# Patient Record
Sex: FEMALE | Race: BLACK OR AFRICAN AMERICAN | NOT HISPANIC OR LATINO | URBAN - METROPOLITAN AREA
[De-identification: names, ages, dates, MRNs, and addresses within clinical notes are randomized per-mention and may not be internally consistent; named-entity substitution may affect disease eponyms.]

---

## 2023-05-22 ENCOUNTER — WEB ENCOUNTER (OUTPATIENT)
Dept: URBAN - METROPOLITAN AREA CLINIC 70 | Facility: CLINIC | Age: 43
End: 2023-05-22

## 2023-05-25 ENCOUNTER — OFFICE VISIT (OUTPATIENT)
Dept: URBAN - METROPOLITAN AREA CLINIC 70 | Facility: CLINIC | Age: 43
End: 2023-05-25

## 2023-05-25 RX ORDER — TELMISARTAN AND HYDROCHLOROTHIAZIDE 40; 12.5 MG/1; MG/1
1 TABLET TABLET ORAL ONCE A DAY
Status: ACTIVE | COMMUNITY

## 2023-05-25 RX ORDER — HYDROXYCHLOROQUINE SULFATE 200 MG/1
AS DIRECTED TABLET ORAL
Status: ACTIVE | COMMUNITY

## 2023-05-25 RX ORDER — NAPROXEN 500 MG/1
TAKE 1 TABLET BY ORAL ROUTE BID PRN TABLET ORAL 2
Qty: 0 | Refills: 0 | Status: DISCONTINUED | COMMUNITY
Start: 1900-01-01

## 2023-05-25 RX ORDER — CITALOPRAM 20 MG/1
TAKE 1 TABLET (20 MG) BY ORAL ROUTE ONCE DAILY TABLET, FILM COATED ORAL 1
Qty: 0 | Refills: 0 | Status: DISCONTINUED | COMMUNITY
Start: 1900-01-01

## 2023-05-25 RX ORDER — LEVOTHYROXINE SODIUM 125 UG/1
1 TABLET IN THE MORNING ON AN EMPTY STOMACH TABLET ORAL ONCE A DAY
Status: ACTIVE | COMMUNITY

## 2023-05-25 RX ORDER — CALCIUM CARBONATE 300MG(750)
AS DIRECTED TABLET,CHEWABLE ORAL
Status: ACTIVE | COMMUNITY

## 2023-05-25 RX ORDER — TELMISARTAN AND HYDROCHLOROTHIAZIDE 80; 25 MG/1; MG/1
TAKE 1 TABLET BY ORAL ROUTE ONCE DAILY TABLET ORAL 1
Qty: 0 | Refills: 0 | Status: DISCONTINUED | COMMUNITY
Start: 1900-01-01

## 2023-05-25 RX ORDER — FLUTICASONE PROPIONATE AND SALMETEROL 50; 100 UG/1; UG/1
INHALE 1 PUFF BY INHALATION ROUTE 2 TIMES PER DAY IN THE MORNING AND EVENING APPROXIMATELY 12 HOURS APART POWDER RESPIRATORY (INHALATION) 2
Qty: 1 | Refills: 0 | Status: DISCONTINUED | COMMUNITY
Start: 1900-01-01

## 2023-05-25 RX ORDER — TENOFOVIR ALAFENAMIDE 25 MG/1
TAKE 1 TABLET (25 MG) BY ORAL ROUTE ONCE DAILY FOR 30 DAYS TABLET ORAL 1
Qty: 30 | Refills: 4 | Status: DISCONTINUED | COMMUNITY
Start: 2017-06-12

## 2023-05-25 RX ORDER — FLUOXETINE HYDROCHLORIDE 20 MG/1
1 CAPSULE CAPSULE ORAL ONCE A DAY
Status: ACTIVE | COMMUNITY

## 2023-06-17 PROBLEM — 239915006: Status: ACTIVE | Noted: 2023-06-17

## 2023-06-17 PROBLEM — 193093009: Status: ACTIVE | Noted: 2023-06-17

## 2023-06-17 PROBLEM — 40930008: Status: ACTIVE | Noted: 2023-06-17

## 2023-06-17 PROBLEM — 840539006: Status: ACTIVE | Noted: 2023-06-17

## 2023-06-17 PROBLEM — 34713006: Status: ACTIVE | Noted: 2023-06-17

## 2023-06-27 ENCOUNTER — OFFICE VISIT (OUTPATIENT)
Dept: URBAN - METROPOLITAN AREA TELEHEALTH 2 | Facility: TELEHEALTH | Age: 43
End: 2023-06-27
Payer: OTHER GOVERNMENT

## 2023-06-27 ENCOUNTER — LAB OUTSIDE AN ENCOUNTER (OUTPATIENT)
Dept: URBAN - METROPOLITAN AREA TELEHEALTH 2 | Facility: TELEHEALTH | Age: 43
End: 2023-06-27

## 2023-06-27 VITALS — BODY MASS INDEX: 22.5 KG/M2 | HEIGHT: 66 IN | WEIGHT: 140 LBS

## 2023-06-27 DIAGNOSIS — E03.9 HYPOTHYROIDISM, UNSPECIFIED TYPE: ICD-10-CM

## 2023-06-27 DIAGNOSIS — B18.1 CHRONIC ACTIVE TYPE B VIRAL HEPATITIS: ICD-10-CM

## 2023-06-27 DIAGNOSIS — K58.9 IRRITABLE BOWEL SYNDROME: ICD-10-CM

## 2023-06-27 DIAGNOSIS — M35.00 SECONDARY SJOGREN'S SYNDROME: ICD-10-CM

## 2023-06-27 DIAGNOSIS — U07.1 COVID: ICD-10-CM

## 2023-06-27 DIAGNOSIS — J45.909 ASTHMA: ICD-10-CM

## 2023-06-27 DIAGNOSIS — E55.9 VITAMIN D DEFICIENCY: ICD-10-CM

## 2023-06-27 DIAGNOSIS — Z71.89 VACCINE COUNSELING: ICD-10-CM

## 2023-06-27 DIAGNOSIS — G51.0 BELL'S PALSY: ICD-10-CM

## 2023-06-27 DIAGNOSIS — Z79.899 HIGH RISK MEDICATION USE: ICD-10-CM

## 2023-06-27 DIAGNOSIS — R76.9 ABNORMAL IMMUNOLOGICAL FINDING IN SERUM: ICD-10-CM

## 2023-06-27 PROCEDURE — 99205 OFFICE O/P NEW HI 60 MIN: CPT

## 2023-06-27 RX ORDER — HYDROXYCHLOROQUINE SULFATE 200 MG/1
AS DIRECTED TABLET ORAL
Status: ACTIVE | COMMUNITY

## 2023-06-27 RX ORDER — CYCLOSPORINE 0.5 MG/ML
1 DROP INTO AFFECTED EYE EMULSION OPHTHALMIC TWICE A DAY
Status: ACTIVE | COMMUNITY

## 2023-06-27 RX ORDER — CHOLECALCIFEROL (VITAMIN D3) 50 MCG
1 TABLET TABLET ORAL ONCE A DAY
Status: ACTIVE | COMMUNITY

## 2023-06-27 RX ORDER — LEVOTHYROXINE SODIUM 125 UG/1
1 TABLET IN THE MORNING ON AN EMPTY STOMACH TABLET ORAL ONCE A DAY
Status: ACTIVE | COMMUNITY

## 2023-06-27 RX ORDER — FLUOXETINE HYDROCHLORIDE 20 MG/1
1 CAPSULE CAPSULE ORAL ONCE A DAY
Status: ACTIVE | COMMUNITY

## 2023-06-27 RX ORDER — CALCIUM CARBONATE 300MG(750)
AS DIRECTED TABLET,CHEWABLE ORAL
Status: ACTIVE | COMMUNITY

## 2023-06-27 RX ORDER — TELMISARTAN AND HYDROCHLOROTHIAZIDE 40; 12.5 MG/1; MG/1
1 TABLET TABLET ORAL ONCE A DAY
Status: ACTIVE | COMMUNITY

## 2023-06-27 NOTE — HPI-TODAY'S VISIT:
Patient is a 42-year-old female last seen 2017 and being referred from the [facility] rheumatology Rossy Alicia to see us for hep b liver issues.  Copy of the note will be sent to referring providers.  She says she was diagnosed AIH and was diagnosed not by bx but by labs,  She says she is seen once a year for her hep b and says was not very different.  AIH has markers that suggest it but many diseases can generate the markers and even medicines as well. Many times a bx is needed to confirm.  Per the notes: Patient apparently has a history of Sjogren's syndrome with inflammatory arthritis and also reported history of autoimmune hepatitis.  I did not see hep b mentioned in the info that we saw.  Patient listed as being on a variety of medicines including artificial tears as needed, magnesium 400 mg twice daily, fish oil 1000 mg a day, vitamin D 10,000 units a day for 1 month and then 2000 units a day, she has a copper IUD, Prozac 20 mg a day, Synthroid 125 mcg a day, hydrochlorothiazide telmisartan 12.5/40 mg a day, and Plaquenil 200 mg a day.  She is on plaquenil for her rheum issues and also dxd no RA and re that she has not reseen rheum for that and seen by whitney monzon.  Rheumatoid factor can be false positive.  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  Surgical history includes thyroid lobectomy in the past, cholecystectomy in the past, left fifth toe x2 hammertoe surgery, right knee meniscal repair, breast augmentation in 2002.  Family history is that she is adopted but biologic mother had a history of hep b and more recently learned of RA/ autoimmune disorders.  Social history lives with daughter/niece 16/13 with autism and has son who is 6-year-old child with mild cerebral palsy. Not a smoker.  Infrequent alcohol.  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  Allergies none.  Last major diagnostic procedures/hospitalization was in arthritis third 2015.  Note mentions that the patient's Sjogren's arthritis is also accompanied by possible autoimmune hepatitis and has a history of hep B felt to be related from vertical transmission from both mother.    They mentioned she has not been on CellCept.  In the notes she knew that she saw us many years ago.  Recent labs showed sed rate 19, aldolase 6.2, CPK 68, gamma GTP 40, WBC 6.4 hemoglobin 12.6 platelet count 272 MCV 94.9 neutrophils 3392 and lymphocytes 35.9.  Sodium 139 potassium 4.0 glucose 88 BUN 13 creatinine 0.8 calcium 9.5 bilirubin 0.6 alk phos 61 AST 56  albumin 4.5  She says for 3m just seeing Dr Alicia and so is establishing with her and says maybe for the last 3-4 m labs have been up and so that is being.  She denied turmeric or green tea or gsm.  She says 8 yrs ago she had a flare and that led to referral then and when seen not felt due to aih and maybe due to hep and labs settled down.   Weight listed as being had a BMI of 24.54.   September 2017 labs showed glucose 88 BUN of 7 creatinine 0.73 sodium 141 potassium 3.8 calcium 9.5 albumin 4.2 bilirubin 0.6 alk phos 59 AST 19 ALT 21 WBC 5.7 hemoglobin 13.1 plate count 233 MCV 95.5. Last visit note that I could find was from September 27, 2017 Harris states that she had the history of the hepatitis B.  Was on.  At that time.  Hep B level was 550 then AST 19 ALT 21 bilirubin 0.6 alk phos 59  Notes mention that she had asthma and was using however, and that her blood pressure has been variable at that time.    Had previously seen Dr. CHEN for irritable bowel.  Hep A immunity had been seen.  Reported initial biopsy stage III in the past.   Colonoscopy with polyp in 2015. Plan 1.  We need to update labs but with the emphasis on the hep b and its role on this. May need a bx pending. 2.  We need toupdate imaging. 3.  Need to make sure that the hep B is under control and not flaring as a cause of lfts, Need to look IGGs to see if the aih feature may be on and do a bx if not sure. 4. If go isp for aih or rheum issue may need to be on meds for the hpe b to control. 5. RTC in 3-4 weeks and revisit and do telemed.  Duration of the visit was 63 minutes with 40 minutes of chart prep and 23 minutes healow telemed today for the visit reviewing recent records current status and future plans for the patient.

## 2023-07-18 ENCOUNTER — TELEPHONE ENCOUNTER (OUTPATIENT)
Dept: URBAN - METROPOLITAN AREA CLINIC 86 | Facility: CLINIC | Age: 43
End: 2023-07-18

## 2023-07-18 ENCOUNTER — WEB ENCOUNTER (OUTPATIENT)
Dept: URBAN - METROPOLITAN AREA CLINIC 86 | Facility: CLINIC | Age: 43
End: 2023-07-18

## 2023-07-18 NOTE — HPI-TODAY'S VISIT:
Dear Apryl Haley, July 14 labs show sodium 138 potassium 3.6 chloride 106 CO2 26 BUN of 11 creatinine 0.79 total protein 7.0 bilirubin 0.7 albumin 3.7 alk phos 49 and these are normal.  Your AST however was 94 ALT was 202 calcium 8.8. WBC 4.71 hemoglobin 11.5 platelet count 245 with an MCV slightly up at 99.  Neutrophils 2.10 and lymphocytes 2.0. Did not see the hep B level listed as pending or come back on either of the 2 faxes sent. We need to know what this shows to know if the hep B could be driving the labs to be up. The hep B DNA level was also one of the tests that we ordered as was the hep B surface antigen as well as immunoglobulins but those tests have not come back. If you have access to the portal for the labs where you did these, please if these are pending and if so confirm for us. Last labs that we saw on you showed an AST of 56 and an ALT of 122 so these labs are higher but really need that hep B level to know what is the next step. Dr Malone

## 2023-07-19 ENCOUNTER — TELEPHONE ENCOUNTER (OUTPATIENT)
Dept: URBAN - METROPOLITAN AREA CLINIC 86 | Facility: CLINIC | Age: 43
End: 2023-07-19

## 2023-07-19 NOTE — HPI-TODAY'S VISIT:
Dear Apryl Haley,  July 14th labs showed IgG normal at 1417 with IgM normal at 119 and IgA normal at 343.  Given that these values are normal, this makes it less likely that autoimmune disorders triggered the liver labs.  We are now just waiting for the hepatitis B DNA to come back and if it significantly elevated then we just need to proceed to treatment as the next step but if it is not significantly elevated we need to talk about possibly doing a liver biopsy. Dr Malone

## 2023-07-19 NOTE — HPI-TODAY'S VISIT:
Dear Apryl Haley, July 14 hepatitis B surface antigen positive and waiting for the HBV dna. Do you see it pending in your lab pt portal there? Dr Malone

## 2023-07-20 ENCOUNTER — WEB ENCOUNTER (OUTPATIENT)
Dept: URBAN - METROPOLITAN AREA CLINIC 86 | Facility: CLINIC | Age: 43
End: 2023-07-20

## 2023-07-21 ENCOUNTER — TELEPHONE ENCOUNTER (OUTPATIENT)
Dept: URBAN - METROPOLITAN AREA CLINIC 86 | Facility: CLINIC | Age: 43
End: 2023-07-21

## 2023-07-21 ENCOUNTER — WEB ENCOUNTER (OUTPATIENT)
Dept: URBAN - METROPOLITAN AREA CLINIC 86 | Facility: CLINIC | Age: 43
End: 2023-07-21

## 2023-07-21 RX ORDER — LEVOTHYROXINE SODIUM 125 UG/1
1 TABLET IN THE MORNING ON AN EMPTY STOMACH TABLET ORAL ONCE A DAY
COMMUNITY

## 2023-07-21 RX ORDER — TELMISARTAN AND HYDROCHLOROTHIAZIDE 40; 12.5 MG/1; MG/1
1 TABLET TABLET ORAL ONCE A DAY
COMMUNITY

## 2023-07-21 RX ORDER — FLUOXETINE HYDROCHLORIDE 20 MG/1
1 CAPSULE CAPSULE ORAL ONCE A DAY
COMMUNITY

## 2023-07-21 RX ORDER — CALCIUM CARBONATE 300MG(750)
AS DIRECTED TABLET,CHEWABLE ORAL
COMMUNITY

## 2023-07-21 RX ORDER — HYDROXYCHLOROQUINE SULFATE 200 MG/1
AS DIRECTED TABLET ORAL
COMMUNITY

## 2023-07-21 RX ORDER — CYCLOSPORINE 0.5 MG/ML
1 DROP INTO AFFECTED EYE EMULSION OPHTHALMIC TWICE A DAY
COMMUNITY

## 2023-07-21 RX ORDER — TENOFOVIR DISPROXIL FUMARATE 300 MG/1
1 TABLET TABLET ORAL ONCE A DAY
Qty: 30 | Refills: 1 | OUTPATIENT
Start: 2023-07-24

## 2023-07-21 RX ORDER — CHOLECALCIFEROL (VITAMIN D3) 50 MCG
1 TABLET TABLET ORAL ONCE A DAY
COMMUNITY

## 2023-07-21 NOTE — HPI-TODAY'S VISIT:
Dear Apryl Haley,  July 18th: finally hbv labs showing hep B level at 6.8 million. July 14 labs showed sodium 138 potassium 3.6 chloride 106 CO2 26 BUN of 11 creatinine 0.79 total bilirubin 0.7 alk phos 49 AST of 94 .  Calcium 8.8.  Hep B surface antigen was confirmed positive.   Quantitative immunoglobulins showed IgG normal at 1417, IgM normal 119 and IgA normal at 343.  This suggests less likely for an immune driven event. WBC 4.71 hemoglobin 11.5 platelet count 245 with an MCV elevated slightly at 99. The treatment criteria for hepatitis B is to have a DNA level over 2000, and an abnormal ALT over 2 review had both. As you recall also we had been concerned if this could be somehow related to your immune issues or not and that you also may be soon or  in future be needing stronger immunosuppression and given the HBV DNA level, the abnormal ALT, and the possible need for stronger immunosuppression for your rheumatologic issues,  I believe that you should move forward and start on a medication for the hepatitis B and that is in line with what we discussed such as tenofovir DF. This will require close monitoring to look at your response. I could not reach you todayto review this,  so I am sending this to you through the portal so that you can review it and if you decide that you would like to start on the medication for this now as we discussed also at the visit, then I can call it in, and we can start it over the weekend or we can also discuss this at your upcoming visit on the 24th in more detail with you. You will need close lab monitoring again with this and we would order for labs in 2 and 4 weeks and then in  8 weeks after starting this medication so we can watch the on tx HBV response and to make sure that the liver labs drop do not have an immune flare as we start to suppress the hepatitis B. This medicine also requires close renal monitoring. Dr. Malone

## 2023-07-24 ENCOUNTER — OFFICE VISIT (OUTPATIENT)
Dept: URBAN - METROPOLITAN AREA CLINIC 117 | Facility: CLINIC | Age: 43
End: 2023-07-24
Payer: OTHER GOVERNMENT

## 2023-07-24 ENCOUNTER — WEB ENCOUNTER (OUTPATIENT)
Dept: URBAN - METROPOLITAN AREA CLINIC 86 | Facility: CLINIC | Age: 43
End: 2023-07-24

## 2023-07-24 DIAGNOSIS — B19.10 HEPATITIS B: ICD-10-CM

## 2023-07-24 PROCEDURE — 76705 ECHO EXAM OF ABDOMEN: CPT

## 2023-07-24 PROCEDURE — 93975 VASCULAR STUDY: CPT

## 2023-07-24 RX ORDER — FLUOXETINE HYDROCHLORIDE 20 MG/1
1 CAPSULE CAPSULE ORAL ONCE A DAY
Status: ACTIVE | COMMUNITY

## 2023-07-24 RX ORDER — CALCIUM CARBONATE 300MG(750)
AS DIRECTED TABLET,CHEWABLE ORAL
Status: ACTIVE | COMMUNITY

## 2023-07-24 RX ORDER — HYDROXYCHLOROQUINE SULFATE 200 MG/1
AS DIRECTED TABLET ORAL
Status: ACTIVE | COMMUNITY

## 2023-07-24 RX ORDER — LEVOTHYROXINE SODIUM 125 UG/1
1 TABLET IN THE MORNING ON AN EMPTY STOMACH TABLET ORAL ONCE A DAY
Status: ACTIVE | COMMUNITY

## 2023-07-24 RX ORDER — CYCLOSPORINE 0.5 MG/ML
1 DROP INTO AFFECTED EYE EMULSION OPHTHALMIC TWICE A DAY
Status: ACTIVE | COMMUNITY

## 2023-07-24 RX ORDER — CHOLECALCIFEROL (VITAMIN D3) 50 MCG
1 TABLET TABLET ORAL ONCE A DAY
Status: ACTIVE | COMMUNITY

## 2023-07-24 RX ORDER — TELMISARTAN AND HYDROCHLOROTHIAZIDE 40; 12.5 MG/1; MG/1
1 TABLET TABLET ORAL ONCE A DAY
Status: ACTIVE | COMMUNITY

## 2023-07-25 ENCOUNTER — TELEPHONE ENCOUNTER (OUTPATIENT)
Dept: URBAN - METROPOLITAN AREA CLINIC 86 | Facility: CLINIC | Age: 43
End: 2023-07-25

## 2023-07-25 ENCOUNTER — WEB ENCOUNTER (OUTPATIENT)
Dept: URBAN - METROPOLITAN AREA CLINIC 86 | Facility: CLINIC | Age: 43
End: 2023-07-25

## 2023-07-25 NOTE — HPI-TODAY'S VISIT:
Dear Apryl Haley,  July 24th ultrasound unfortunately came back after you and I spoke. Pancreas was unremarkable. Liver was homogeneous/even in echotexture with no discrete liver lesions. Liver vessels patent which was good to see. Common bile duct was normal at 3 mm. Right kidney 10.5 cm no hydronephrosis. Spleen normal at 9.8 cm. Splenic vessels were patent as well. Overall good to see that everything remains stable and lets get you started on the hepatitis B medicine and reassess issues. Good to be able to catch up with you today and go over all the information directly with you.  I am sorry that we did not have this information back at that time. Dr. Malone

## 2023-07-26 ENCOUNTER — TELEPHONE ENCOUNTER (OUTPATIENT)
Dept: URBAN - METROPOLITAN AREA CLINIC 86 | Facility: CLINIC | Age: 43
End: 2023-07-26

## 2023-07-26 ENCOUNTER — WEB ENCOUNTER (OUTPATIENT)
Dept: URBAN - METROPOLITAN AREA CLINIC 86 | Facility: CLINIC | Age: 43
End: 2023-07-26

## 2023-07-31 ENCOUNTER — LAB OUTSIDE AN ENCOUNTER (OUTPATIENT)
Dept: URBAN - METROPOLITAN AREA CLINIC 86 | Facility: CLINIC | Age: 43
End: 2023-07-31

## 2023-08-04 ENCOUNTER — OFFICE VISIT (OUTPATIENT)
Dept: URBAN - METROPOLITAN AREA TELEHEALTH 2 | Facility: TELEHEALTH | Age: 43
End: 2023-08-04
Payer: OTHER GOVERNMENT

## 2023-08-04 VITALS — HEIGHT: 66 IN | WEIGHT: 145 LBS | BODY MASS INDEX: 23.3 KG/M2

## 2023-08-04 DIAGNOSIS — K58.8 OTHER IRRITABLE BOWEL SYNDROME: ICD-10-CM

## 2023-08-04 DIAGNOSIS — R74.8 ABNORMAL LIVER ENZYMES: ICD-10-CM

## 2023-08-04 DIAGNOSIS — B18.1 CHRONIC ACTIVE TYPE B VIRAL HEPATITIS: ICD-10-CM

## 2023-08-04 DIAGNOSIS — J45.909 ASTHMA: ICD-10-CM

## 2023-08-04 PROCEDURE — 99214 OFFICE O/P EST MOD 30 MIN: CPT

## 2023-08-04 RX ORDER — CHOLECALCIFEROL (VITAMIN D3) 50 MCG
1 TABLET TABLET ORAL ONCE A DAY
Status: ACTIVE | COMMUNITY

## 2023-08-04 RX ORDER — LEVOTHYROXINE SODIUM 125 UG/1
1 TABLET IN THE MORNING ON AN EMPTY STOMACH TABLET ORAL ONCE A DAY
Status: ACTIVE | COMMUNITY

## 2023-08-04 RX ORDER — TELMISARTAN AND HYDROCHLOROTHIAZIDE 40; 12.5 MG/1; MG/1
1 TABLET TABLET ORAL ONCE A DAY
Status: ACTIVE | COMMUNITY

## 2023-08-04 RX ORDER — FLUOXETINE HYDROCHLORIDE 20 MG/1
1 CAPSULE CAPSULE ORAL ONCE A DAY
Status: ACTIVE | COMMUNITY

## 2023-08-04 RX ORDER — TENOFOVIR DISPROXIL FUMARATE 300 MG/1
1 TABLET TABLET ORAL ONCE A DAY
Qty: 30 | Refills: 1 | Status: ACTIVE | COMMUNITY
Start: 2023-07-24

## 2023-08-04 RX ORDER — HYDROXYCHLOROQUINE SULFATE 200 MG/1
AS DIRECTED TABLET ORAL
Status: ACTIVE | COMMUNITY

## 2023-08-04 RX ORDER — CALCIUM CARBONATE 300MG(750)
AS DIRECTED TABLET,CHEWABLE ORAL
Status: ACTIVE | COMMUNITY

## 2023-08-04 RX ORDER — CYCLOSPORINE 0.5 MG/ML
1 DROP INTO AFFECTED EYE EMULSION OPHTHALMIC TWICE A DAY
Status: ACTIVE | COMMUNITY

## 2023-08-04 NOTE — HPI-TODAY'S VISIT:
Patient is a 42-year-old female last seen June 2023 and prior back in 2017 and being referred from the [facility] rheumatology Rossy Alicia to see us for hep b liver issues and on tdf now.  Copy of the note will be sent to referring providers.  She did get the TDF and delivered to her and she has been on it for 4 days. Not able to get samples pre the treatment.  She may want to take it with food. She also comments size if larger and with her sjogrens some difficulty swallowing it and if persists may need to appeal for the much smaller vemlidy.  She says she was previously diagnosed AIH and was diagnosed not by bx but by labs and hbv is what we saw.  AIH has markers that suggest it but many diseases can generate the markers and even medicines as well. Many times a bx is needed to confirm.  July 24th ultrasound unfortunately came back after you and I spoke. Pancreas was unremarkable. Liver was homogeneous/even in echotexture with no discrete liver lesions. Liver vessels patent which was good to see. Common bile duct was normal at 3 mm. Right kidney 10.5 cm no hydronephrosis. Spleen normal at 9.8 cm. Splenic vessels were patent as well. Overall good to see that everything remains stable and lets get you started on the hepatitis B medicine and reassess issues.   July 18th: finally hbv labs showing hep B level at 6.8 million. July 14 labs showed sodium 138 potassium 3.6 chloride 106 CO2 26 BUN of 11 creatinine 0.79 total bilirubin 0.7 alk phos 49 AST of 94 .  Calcium 8.8.  Hep B surface antigen was confirmed positive.   Quantitative immunoglobulins showed IgG normal at 1417, IgM normal 119 and IgA normal at 343.  This suggests less likely for an immune driven event. WBC 4.71 hemoglobin 11.5 platelet count 245 with an MCV elevated slightly at 99. The treatment criteria for hepatitis B is to have a DNA level over 2000, and an abnormal ALT over 2 review had both.  She needs to remain on TDF to control the hep b and can then avoid worsening other isp.  We do need to follow labs in 2 and 4 and 6 weeks and look for an immune flare as drop the viral load can see.  Per the notes: Patient apparently has a history of Sjogren's syndrome with inflammatory arthritis and also reported history of autoimmune hepatitis.  I did not see hep b mentioned in the info that we saw.  Patient listed as being on a variety of medicines including artificial tears as needed, magnesium 400 mg twice daily, fish oil 1000 mg a day, vitamin D 10,000 units a day for 1 month and then 2000 units a day, she has a copper IUD, Prozac 20 mg a day, Synthroid 125 mcg a day, hydrochlorothiazide telmisartan 12.5/40 mg a day, and Plaquenil 200 mg a day.  She is on plaquenil for her rheum issues and also dxd no RA and re that she has not reseen rheum for that and seen by whitney monzon.  Rheumatoid factor can be false positive.  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  Surgical history includes thyroid lobectomy in the past, cholecystectomy in the past, left fifth toe x2 hammertoe surgery, right knee meniscal repair, breast augmentation in 2002.  Family history is that she is adopted but biologic mother had a history of hep b and more recently learned of RA/ autoimmune disorders.  Social history lives with daughter/niece 16/13 with autism and has son who is 6-year-old child with mild cerebral palsy. Not a smoker.  Infrequent alcohol.  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  Allergies none.  Last major diagnostic procedures/hospitalization was in arthritis third 2015.  Note mentions that the patient's Sjogren's arthritis is also accompanied by possible autoimmune hepatitis and has a history of hep B felt to be related from vertical transmission from both mother.    They mentioned she has not been on CellCept.  In the notes she knew that she saw us many years ago.  Recent labs showed sed rate 19, aldolase 6.2, CPK 68, gamma GTP 40, WBC 6.4 hemoglobin 12.6 platelet count 272 MCV 94.9 neutrophils 3392 and lymphocytes 35.9.  Sodium 139 potassium 4.0 glucose 88 BUN 13 creatinine 0.8 calcium 9.5 bilirubin 0.6 alk phos 61 AST 56  albumin 4.5  She says for 3m just seeing Dr Alicia and so is establishing with her and says maybe for the last 3-4 m labs have been up and so that is being.  She denied turmeric or green tea or gsm.  She says 8 yrs ago she had a flare and that led to referral then and when seen not felt due to aih and maybe due to hep and labs settled down.   Weight listed as being had a BMI of 24.54.   September 2017 labs showed glucose 88 BUN of 7 creatinine 0.73 sodium 141 potassium 3.8 calcium 9.5 albumin 4.2 bilirubin 0.6 alk phos 59 AST 19 ALT 21 WBC 5.7 hemoglobin 13.1 plate count 233 MCV 95.5. Last visit note that I could find was from September 27, 2017 Harris states that she had the history of the hepatitis B.  Was on.  At that time.  Hep B level was 550 then AST 19 ALT 21 bilirubin 0.6 alk phos 59  Notes mention that she had asthma and was using however, and that her blood pressure has been variable at that time.    Had previously seen Dr. CHEN for irritable bowel.  Hep A immunity had been seen.  Reported initial biopsy stage III in the past.   Colonoscopy with polyp in 2015.  Plan 1. Take the tdf with food. 2. Do labs in 2 and 4 and 6 weeks. 3. DO telemed 7-8 weeks to see how doing. 4. Let us know if issues, 5. If has toxicity to tdf then can apply to tdf.  Duration of the visit was 31 minutes with 15 minutes of chart prep loaing the labs and u.s and contact notes with pt for the visit and then 16 minutes by healow telemed today for the visit reviewing recent records current status and future plans for the patient.

## 2023-08-07 ENCOUNTER — LAB OUTSIDE AN ENCOUNTER (OUTPATIENT)
Dept: URBAN - METROPOLITAN AREA CLINIC 86 | Facility: CLINIC | Age: 43
End: 2023-08-07

## 2023-08-16 ENCOUNTER — TELEPHONE ENCOUNTER (OUTPATIENT)
Dept: URBAN - METROPOLITAN AREA CLINIC 86 | Facility: CLINIC | Age: 43
End: 2023-08-16

## 2023-08-16 NOTE — HPI-TODAY'S VISIT:
Dear Apryl Haley,  The 8/15/2023 labs were sent to me. The sodium 136 potassium 3.3, creatinine 0.8, bilirubin 0.8, alkaline phosphatase 49, AST 75, .  The IgA 388, IgG 1574, IgM 114.  The hepatitis B surface antigen is positive.  They did not run a hepatitis B viral DNA.  Previously though back in July the alkaline phosphatase is 49 and the AST 94, .  So the liver enzymes are definitely lower and i suspect the hbv dna is still pending and we will follow up when we have that result. I called and discussed these with you as  was out of the office but sending you this as well for you records.  Donna Moss PA-C

## 2023-08-18 ENCOUNTER — TELEPHONE ENCOUNTER (OUTPATIENT)
Dept: URBAN - METROPOLITAN AREA CLINIC 86 | Facility: CLINIC | Age: 43
End: 2023-08-18

## 2023-08-18 ENCOUNTER — WEB ENCOUNTER (OUTPATIENT)
Dept: URBAN - METROPOLITAN AREA CLINIC 86 | Facility: CLINIC | Age: 43
End: 2023-08-18

## 2023-08-18 NOTE — HPI-TODAY'S VISIT:
Dear Apryl Haley, August 15 labs were sent in showing that your IgA was normal at 388, IgG normal at 1574, and IgM normal at 114. Other labs showed sodium 136 potassium 3.3 chloride 105 CO2 26 BUN of 7 creatinine 0.8 and glucose was elevated at 146 and possibly you were not fasting that day?  Albumin was normal at 4.1, bilirubin 0.8, alk phos 49 and AST 75 and  with calcium 9.5. WBC 4.74, hemoglobin 12.9, platelet count 262 with an MCV of 96 which is elevated.  Neutrophils 2.11 and lymphocytes 2.21 normal. As you recall, you had recently started the tenofovir DF and your July 14 at labs had shown an AST that was 94 and an ALT of 202 which were higher so these are coming down. We really need to see what the hepatitis B level comes back. Your previous creatinine was 0.79 and this is approximate the same at 0.8. Please redo labs again for us again in another 2 weeks and 4 weeks. We hope to see these labs continue to drop more and they should on this medicine. Dr. Malone

## 2023-08-21 ENCOUNTER — LAB OUTSIDE AN ENCOUNTER (OUTPATIENT)
Dept: URBAN - METROPOLITAN AREA TELEHEALTH 2 | Facility: TELEHEALTH | Age: 43
End: 2023-08-21

## 2023-08-21 ENCOUNTER — WEB ENCOUNTER (OUTPATIENT)
Dept: URBAN - METROPOLITAN AREA CLINIC 86 | Facility: CLINIC | Age: 43
End: 2023-08-21

## 2023-08-21 ENCOUNTER — TELEPHONE ENCOUNTER (OUTPATIENT)
Dept: URBAN - METROPOLITAN AREA CLINIC 86 | Facility: CLINIC | Age: 43
End: 2023-08-21

## 2023-08-21 NOTE — HPI-TODAY'S VISIT:
Dear Apryl Haley, More labs sent in from August 15, 2023 showing that the hep B surface antigen was positive which is not surprising.  We will see what the DNA is doing with the treatment. Dr Malone

## 2023-08-23 ENCOUNTER — TELEPHONE ENCOUNTER (OUTPATIENT)
Dept: URBAN - METROPOLITAN AREA CLINIC 86 | Facility: CLINIC | Age: 43
End: 2023-08-23

## 2023-08-23 NOTE — HPI-TODAY'S VISIT:
Dear Apryl Haley, Curiously they did send this to the [facility] lab that you did previously on August 15.  It was as you stated to 135,000 for the hepatitis B virus level. The July virus level was 6.8 million so clearly lower now. Need to follow this over time. Dr. Malone

## 2023-08-24 ENCOUNTER — TELEPHONE ENCOUNTER (OUTPATIENT)
Dept: URBAN - METROPOLITAN AREA CLINIC 86 | Facility: CLINIC | Age: 43
End: 2023-08-24

## 2023-08-24 NOTE — HPI-TODAY'S VISIT:
Dear Apryl Haley, August 15 labs showed sodium 136 potassium low at 3.3 and you should have like a banana twice a day, or V8 juice twice a day or tomato juice twice a day for 3 days to see if that will correct that. Chloride 105 CO2 of 26 BUN of 7 creatinine 0.8. Sugar was 146 elevated and may be not fasting that day?  Please share with primary provider. Albumin normal 4.1, bilirubin 0.8 alk phos 49 AST 75 and .  Prior July labs had shown an AST of 94 and ALT of 202 so this appears to be coming down.  Prior creatinine was 0.79 so that is the same.  Previous hep B level as we talked about had been higher at 6.8 million so overall labs moving in the right direction there. Your hep B surface antigen was noted to be positive which again not a surprise that you have been chronically positive.  The hep B level as you saw yesterday had come back at 135,000 The immunoglobulins which are looking for other signs of immune issues showed that your IgG is normal at 1574 and IgM normal at 114 IgA normal at 388. WBC was 4.74, hemoglobin 12.9 and platelet count was 262.  Your MCV was a little bit up at 96.  Your MCH was a little low at 35. Neutrophils were 2.11 and lymphocytes were 2.21. I would redo the labs 2 weeks after these and then again in 4 weeks and then at that point if they remain stable we can slow down the labs after that.  Usually the first month or two is  that timeframe during which we need to carefully watch and look for any flares or changes on the labs. Dr Malone

## 2023-08-28 ENCOUNTER — LAB OUTSIDE AN ENCOUNTER (OUTPATIENT)
Dept: URBAN - METROPOLITAN AREA CLINIC 86 | Facility: CLINIC | Age: 43
End: 2023-08-28

## 2023-09-11 ENCOUNTER — LAB OUTSIDE AN ENCOUNTER (OUTPATIENT)
Dept: URBAN - METROPOLITAN AREA CLINIC 86 | Facility: CLINIC | Age: 43
End: 2023-09-11

## 2023-09-20 ENCOUNTER — TELEPHONE ENCOUNTER (OUTPATIENT)
Dept: URBAN - METROPOLITAN AREA CLINIC 86 | Facility: CLINIC | Age: 43
End: 2023-09-20

## 2023-09-20 RX ORDER — TENOFOVIR DISPROXIL FUMARATE 300 MG/1
1 TABLET TABLET ORAL ONCE A DAY
Qty: 30 | Refills: 0
Start: 2023-07-24

## 2023-09-25 ENCOUNTER — LAB OUTSIDE AN ENCOUNTER (OUTPATIENT)
Dept: URBAN - METROPOLITAN AREA CLINIC 86 | Facility: CLINIC | Age: 43
End: 2023-09-25

## 2023-10-05 ENCOUNTER — OFFICE VISIT (OUTPATIENT)
Dept: URBAN - METROPOLITAN AREA TELEHEALTH 2 | Facility: TELEHEALTH | Age: 43
End: 2023-10-05

## 2023-10-05 ENCOUNTER — TELEPHONE ENCOUNTER (OUTPATIENT)
Dept: URBAN - METROPOLITAN AREA CLINIC 86 | Facility: CLINIC | Age: 43
End: 2023-10-05

## 2023-10-05 VITALS — BODY MASS INDEX: 23.3 KG/M2 | WEIGHT: 145 LBS | HEIGHT: 66 IN

## 2023-10-05 RX ORDER — CALCIUM CARBONATE 300MG(750)
AS DIRECTED TABLET,CHEWABLE ORAL
Status: ACTIVE | COMMUNITY

## 2023-10-05 RX ORDER — FLUOXETINE HYDROCHLORIDE 20 MG/1
1 CAPSULE CAPSULE ORAL ONCE A DAY
Status: ACTIVE | COMMUNITY

## 2023-10-05 RX ORDER — CYCLOSPORINE 0.5 MG/ML
1 DROP INTO AFFECTED EYE EMULSION OPHTHALMIC TWICE A DAY
Status: ACTIVE | COMMUNITY

## 2023-10-05 RX ORDER — HYDROXYCHLOROQUINE SULFATE 200 MG/1
AS DIRECTED TABLET ORAL
Status: ACTIVE | COMMUNITY

## 2023-10-05 RX ORDER — CHOLECALCIFEROL (VITAMIN D3) 50 MCG
1 TABLET TABLET ORAL ONCE A DAY
Status: ACTIVE | COMMUNITY

## 2023-10-05 RX ORDER — TELMISARTAN AND HYDROCHLOROTHIAZIDE 40; 12.5 MG/1; MG/1
1 TABLET TABLET ORAL ONCE A DAY
Status: ACTIVE | COMMUNITY

## 2023-10-05 RX ORDER — LEVOTHYROXINE SODIUM 125 UG/1
1 TABLET IN THE MORNING ON AN EMPTY STOMACH TABLET ORAL ONCE A DAY
Status: ACTIVE | COMMUNITY

## 2023-10-05 RX ORDER — TENOFOVIR DISPROXIL FUMARATE 300 MG/1
1 TABLET TABLET ORAL ONCE A DAY
Qty: 30 | Refills: 0 | Status: ACTIVE | COMMUNITY
Start: 2023-07-24

## 2023-10-05 NOTE — HPI-TODAY'S VISIT:
Patient is a 43-year-old female last seen Aug 2023 and prior back in 2017 and being referred from the [facility] rheumatology Rossy Alicia to see us for hep b liver issues and on tdf now.  Copy of the note will be sent to referring providers. Dear Apryl Haley,  The 8/15/2023 labs were sent to me. The sodium 136 potassium 3.3, creatinine 0.8, bilirubin 0.8, alkaline phosphatase 49, AST 75, .  The IgA 388, IgG 1574, IgM 114.  The hepatitis B surface antigen is positive.  They did not run a hepatitis B viral DNA.  Previously though back in July the alkaline phosphatase is 49 and the AST 94, .  So the liver enzymes are definitely lower and i suspect the hbv dna is still pending and we will follow up when we have that result. I called and discussed these with you as  was out of the office but sending you this as well for you records.  Donna Moss PA-C Dear Apryl Haley, August 15 labs showed sodium 136 potassium low at 3.3 and you should have like a banana twice a day, or V8 juice twice a day or tomato juice twice a day for 3 days to see if that will correct that. Chloride 105 CO2 of 26 BUN of 7 creatinine 0.8. Sugar was 146 elevated and may be not fasting that day?  Please share with primary provider. Albumin normal 4.1, bilirubin 0.8 alk phos 49 AST 75 and .  Prior July labs had shown an AST of 94 and ALT of 202 so this appears to be coming down.  Prior creatinine was 0.79 so that is the same.  Previous hep B level as we talked about had been higher at 6.8 million so overall labs moving in the right direction there. Your hep B surface antigen was noted to be positive which again not a surprise that you have been chronically positive.  The hep B level as you saw yesterday had come back at 135,000 The immunoglobulins which are looking for other signs of immune issues showed that your IgG is normal at 1574 and IgM normal at 114 IgA normal at 388. WBC was 4.74, hemoglobin 12.9 and platelet count was 262.  Your MCV was a little bit up at 96.  Your MCH was a little low at 35. Neutrophils were 2.11 and lymphocytes were 2.21. I would redo the labs 2 weeks after these and then again in 4 weeks and then at that point if they remain stable we can slow down the labs after that.  Usually the first month or two is  that timeframe during which we need to carefully watch and look for any flares or changes on the labs. Dr Faisal Haley, Curiously they did send this to the [facility] lab that you did previously on August 15.  It was as you stated to 135,000 for the hepatitis B virus level. The July virus level was 6.8 million so clearly lower now. Need to follow this over time. Dr. Faisal Haley, More labs sent in from August 15, 2023 showing that the hep B surface antigen was positive which is not surprising.  We will see what the DNA is doing with the treatment. Dr Faisal Haley, August 15 labs were sent in showing that your IgA was normal at 388, IgG normal at 1574, and IgM normal at 114. Other labs showed sodium 136 potassium 3.3 chloride 105 CO2 26 BUN of 7 creatinine 0.8 and glucose was elevated at 146 and possibly you were not fasting that day?  Albumin was normal at 4.1, bilirubin 0.8, alk phos 49 and AST 75 and  with calcium 9.5. WBC 4.74, hemoglobin 12.9, platelet count 262 with an MCV of 96 which is elevated.  Neutrophils 2.11 and lymphocytes 2.21 normal. As you recall, you had recently started the tenofovir DF and your July 14 at labs had shown an AST that was 94 and an ALT of 202 which were higher so these are coming down. We really need to see what the hepatitis B level comes back. Your previous creatinine was 0.79 and this is approximate the same at 0.8. Please redo labs again for us again in another 2 weeks and 4 weeks. We hope to see these labs continue to drop more and they should on this medicine. Dr. Malone  She did get the TDF and delivered to her and she has been on it for 4 days. Not able to get samples pre the treatment.  She may want to take it with food. She also comments size if larger and with her sjogrens some difficulty swallowing it and if persists may need to appeal for the much smaller vemlidy.  She says she was previously diagnosed AIH and was diagnosed not by bx but by labs and hbv is what we saw.  AIH has markers that suggest it but many diseases can generate the markers and even medicines as well. Many times a bx is needed to confirm.  July 24th ultrasound unfortunately came back after you and I spoke. Pancreas was unremarkable. Liver was homogeneous/even in echotexture with no discrete liver lesions. Liver vessels patent which was good to see. Common bile duct was normal at 3 mm. Right kidney 10.5 cm no hydronephrosis. Spleen normal at 9.8 cm. Splenic vessels were patent as well. Overall good to see that everything remains stable and lets get you started on the hepatitis B medicine and reassess issues.   July 18th: finally hbv labs showing hep B level at 6.8 million. July 14 labs showed sodium 138 potassium 3.6 chloride 106 CO2 26 BUN of 11 creatinine 0.79 total bilirubin 0.7 alk phos 49 AST of 94 .  Calcium 8.8.  Hep B surface antigen was confirmed positive.   Quantitative immunoglobulins showed IgG normal at 1417, IgM normal 119 and IgA normal at 343.  This suggests less likely for an immune driven event. WBC 4.71 hemoglobin 11.5 platelet count 245 with an MCV elevated slightly at 99. The treatment criteria for hepatitis B is to have a DNA level over 2000, and an abnormal ALT over 2 review had both.  She needs to remain on TDF to control the hep b and can then avoid worsening other isp.  We do need to follow labs in 2 and 4 and 6 weeks and look for an immune flare as drop the viral load can see.  Per the notes: Patient apparently has a history of Sjogren's syndrome with inflammatory arthritis and also reported history of autoimmune hepatitis.  I did not see hep b mentioned in the info that we saw.  Patient listed as being on a variety of medicines including artificial tears as needed, magnesium 400 mg twice daily, fish oil 1000 mg a day, vitamin D 10,000 units a day for 1 month and then 2000 units a day, she has a copper IUD, Prozac 20 mg a day, Synthroid 125 mcg a day, hydrochlorothiazide telmisartan 12.5/40 mg a day, and Plaquenil 200 mg a day.  She is on plaquenil for her rheum issues and also dxd no RA and re that she has not reseen rheum for that and seen by whitney monzon.  Rheumatoid factor can be false positive.  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  Surgical history includes thyroid lobectomy in the past, cholecystectomy in the past, left fifth toe x2 hammertoe surgery, right knee meniscal repair, breast augmentation in 2002.  Family history is that she is adopted but biologic mother had a history of hep b and more recently learned of RA/ autoimmune disorders.  Social history lives with daughter/niece 16/13 with autism and has son who is 6-year-old child with mild cerebral palsy. Not a smoker.  Infrequent alcohol.  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  Allergies none.  Last major diagnostic procedures/hospitalization was in arthritis third 2015.  Note mentions that the patient's Sjogren's arthritis is also accompanied by possible autoimmune hepatitis and has a history of hep B felt to be related from vertical transmission from both mother.    They mentioned she has not been on CellCept.  In the notes she knew that she saw us many years ago.  Recent labs showed sed rate 19, aldolase 6.2, CPK 68, gamma GTP 40, WBC 6.4 hemoglobin 12.6 platelet count 272 MCV 94.9 neutrophils 3392 and lymphocytes 35.9.  Sodium 139 potassium 4.0 glucose 88 BUN 13 creatinine 0.8 calcium 9.5 bilirubin 0.6 alk phos 61 AST 56  albumin 4.5  She says for 3m just seeing Dr Alicia and so is establishing with her and says maybe for the last 3-4 m labs have been up and so that is being.  She denied turmeric or green tea or gsm.  She says 8 yrs ago she had a flare and that led to referral then and when seen not felt due to aih and maybe due to hep and labs settled down.   Weight listed as being had a BMI of 24.54.   September 2017 labs showed glucose 88 BUN of 7 creatinine 0.73 sodium 141 potassium 3.8 calcium 9.5 albumin 4.2 bilirubin 0.6 alk phos 59 AST 19 ALT 21 WBC 5.7 hemoglobin 13.1 plate count 233 MCV 95.5. Last visit note that I could find was from September 27, 2017 Harris states that she had the history of the hepatitis B.  Was on.  At that time.  Hep B level was 550 then AST 19 ALT 21 bilirubin 0.6 alk phos 59  Notes mention that she had asthma and was using however, and that her blood pressure has been variable at that time.    Had previously seen Dr. CHEN for irritable bowel.  Hep A immunity had been seen.  Reported initial biopsy stage III in the past.   Colonoscopy with polyp in 2015.  Plan 1. Take the tdf with food. 2. Do labs in 2 and 4 and 6 weeks. 3. DO telemed 7-8 weeks to see how doing. 4. Let us know if issues, 5. If has toxicity to tdf then can apply to tdf.  Duration of the visit was  minutes with 15 minutes of chart prep loaing the labs and u.s and contact notes with pt for the visit and then 16 minutes by hayden rocha today for the visit reviewing recent records current status and future plans for the patient.

## 2023-10-10 ENCOUNTER — OFFICE VISIT (OUTPATIENT)
Dept: URBAN - METROPOLITAN AREA CLINIC 86 | Facility: CLINIC | Age: 43
End: 2023-10-10

## 2023-10-10 ENCOUNTER — TELEPHONE ENCOUNTER (OUTPATIENT)
Dept: URBAN - METROPOLITAN AREA CLINIC 86 | Facility: CLINIC | Age: 43
End: 2023-10-10

## 2023-10-10 RX ORDER — CYCLOSPORINE 0.5 MG/ML
1 DROP INTO AFFECTED EYE EMULSION OPHTHALMIC TWICE A DAY
Status: ACTIVE | COMMUNITY

## 2023-10-10 RX ORDER — FLUOXETINE HYDROCHLORIDE 20 MG/1
1 CAPSULE CAPSULE ORAL ONCE A DAY
Status: ACTIVE | COMMUNITY

## 2023-10-10 RX ORDER — LEVOTHYROXINE SODIUM 125 UG/1
1 TABLET IN THE MORNING ON AN EMPTY STOMACH TABLET ORAL ONCE A DAY
Status: ACTIVE | COMMUNITY

## 2023-10-10 RX ORDER — TELMISARTAN AND HYDROCHLOROTHIAZIDE 40; 12.5 MG/1; MG/1
1 TABLET TABLET ORAL ONCE A DAY
Status: ACTIVE | COMMUNITY

## 2023-10-10 RX ORDER — CHOLECALCIFEROL (VITAMIN D3) 50 MCG
1 TABLET TABLET ORAL ONCE A DAY
Status: ACTIVE | COMMUNITY

## 2023-10-10 RX ORDER — TENOFOVIR DISPROXIL FUMARATE 300 MG/1
1 TABLET TABLET ORAL ONCE A DAY
Qty: 30 | Refills: 0 | Status: ACTIVE | COMMUNITY
Start: 2023-07-24

## 2023-10-10 RX ORDER — CALCIUM CARBONATE 300MG(750)
AS DIRECTED TABLET,CHEWABLE ORAL
Status: ACTIVE | COMMUNITY

## 2023-10-10 RX ORDER — HYDROXYCHLOROQUINE SULFATE 200 MG/1
AS DIRECTED TABLET ORAL
Status: ACTIVE | COMMUNITY

## 2023-10-10 NOTE — HPI-TODAY'S VISIT:
Patient is a 43-year-old female last seen Aug 2023 and prior back in 2017 and being referred from the [facility] rheumatology Rossy Alicia to see us for hep b liver issues and on tdf now.  Copy of the note will be sent to referring providers. Dear Apryl Haley,  The 8/15/2023 labs were sent to me. The sodium 136 potassium 3.3, creatinine 0.8, bilirubin 0.8, alkaline phosphatase 49, AST 75, .  The IgA 388, IgG 1574, IgM 114.  The hepatitis B surface antigen is positive.  They did not run a hepatitis B viral DNA.  Previously though back in July the alkaline phosphatase is 49 and the AST 94, .  So the liver enzymes are definitely lower and i suspect the hbv dna is still pending and we will follow up when we have that result. I called and discussed these with you as  was out of the office but sending you this as well for you records.  Donna Moss PA-C Dear Apryl Haley, August 15 labs showed sodium 136 potassium low at 3.3 and you should have like a banana twice a day, or V8 juice twice a day or tomato juice twice a day for 3 days to see if that will correct that. Chloride 105 CO2 of 26 BUN of 7 creatinine 0.8. Sugar was 146 elevated and may be not fasting that day?  Please share with primary provider. Albumin normal 4.1, bilirubin 0.8 alk phos 49 AST 75 and .  Prior July labs had shown an AST of 94 and ALT of 202 so this appears to be coming down.  Prior creatinine was 0.79 so that is the same.  Previous hep B level as we talked about had been higher at 6.8 million so overall labs moving in the right direction there. Your hep B surface antigen was noted to be positive which again not a surprise that you have been chronically positive.  The hep B level as you saw yesterday had come back at 135,000 The immunoglobulins which are looking for other signs of immune issues showed that your IgG is normal at 1574 and IgM normal at 114 IgA normal at 388. WBC was 4.74, hemoglobin 12.9 and platelet count was 262.  Your MCV was a little bit up at 96.  Your MCH was a little low at 35. Neutrophils were 2.11 and lymphocytes were 2.21. I would redo the labs 2 weeks after these and then again in 4 weeks and then at that point if they remain stable we can slow down the labs after that.  Usually the first month or two is  that timeframe during which we need to carefully watch and look for any flares or changes on the labs. Dr Faisal Haley, Curiously they did send this to the [facility] lab that you did previously on August 15.  It was as you stated to 135,000 for the hepatitis B virus level. The July virus level was 6.8 million so clearly lower now. Need to follow this over time. Dr. Faisal Haley, More labs sent in from August 15, 2023 showing that the hep B surface antigen was positive which is not surprising.  We will see what the DNA is doing with the treatment. Dr Faisal Haley, August 15 labs were sent in showing that your IgA was normal at 388, IgG normal at 1574, and IgM normal at 114. Other labs showed sodium 136 potassium 3.3 chloride 105 CO2 26 BUN of 7 creatinine 0.8 and glucose was elevated at 146 and possibly you were not fasting that day?  Albumin was normal at 4.1, bilirubin 0.8, alk phos 49 and AST 75 and  with calcium 9.5. WBC 4.74, hemoglobin 12.9, platelet count 262 with an MCV of 96 which is elevated.  Neutrophils 2.11 and lymphocytes 2.21 normal. As you recall, you had recently started the tenofovir DF and your July 14 at labs had shown an AST that was 94 and an ALT of 202 which were higher so these are coming down. We really need to see what the hepatitis B level comes back. Your previous creatinine was 0.79 and this is approximate the same at 0.8. Please redo labs again for us again in another 2 weeks and 4 weeks. We hope to see these labs continue to drop more and they should on this medicine. Dr. Malone  She did get the TDF and delivered to her and she has been on it for 4 days. Not able to get samples pre the treatment.  She may want to take it with food. She also comments size if larger and with her sjogrens some difficulty swallowing it and if persists may need to appeal for the much smaller vemlidy.  She says she was previously diagnosed AIH and was diagnosed not by bx but by labs and hbv is what we saw.  AIH has markers that suggest it but many diseases can generate the markers and even medicines as well. Many times a bx is needed to confirm.  July 24th ultrasound unfortunately came back after you and I spoke. Pancreas was unremarkable. Liver was homogeneous/even in echotexture with no discrete liver lesions. Liver vessels patent which was good to see. Common bile duct was normal at 3 mm. Right kidney 10.5 cm no hydronephrosis. Spleen normal at 9.8 cm. Splenic vessels were patent as well. Overall good to see that everything remains stable and lets get you started on the hepatitis B medicine and reassess issues.   July 18th: finally hbv labs showing hep B level at 6.8 million. July 14 labs showed sodium 138 potassium 3.6 chloride 106 CO2 26 BUN of 11 creatinine 0.79 total bilirubin 0.7 alk phos 49 AST of 94 .  Calcium 8.8.  Hep B surface antigen was confirmed positive.   Quantitative immunoglobulins showed IgG normal at 1417, IgM normal 119 and IgA normal at 343.  This suggests less likely for an immune driven event. WBC 4.71 hemoglobin 11.5 platelet count 245 with an MCV elevated slightly at 99. The treatment criteria for hepatitis B is to have a DNA level over 2000, and an abnormal ALT over 2 review had both.  She needs to remain on TDF to control the hep b and can then avoid worsening other isp.  We do need to follow labs in 2 and 4 and 6 weeks and look for an immune flare as drop the viral load can see.  Per the notes: Patient apparently has a history of Sjogren's syndrome with inflammatory arthritis and also reported history of autoimmune hepatitis.  I did not see hep b mentioned in the info that we saw.  Patient listed as being on a variety of medicines including artificial tears as needed, magnesium 400 mg twice daily, fish oil 1000 mg a day, vitamin D 10,000 units a day for 1 month and then 2000 units a day, she has a copper IUD, Prozac 20 mg a day, Synthroid 125 mcg a day, hydrochlorothiazide telmisartan 12.5/40 mg a day, and Plaquenil 200 mg a day.  She is on plaquenil for her rheum issues and also dxd no RA and re that she has not reseen rheum for that and seen by whitney monzon.  Rheumatoid factor can be false positive.  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  Surgical history includes thyroid lobectomy in the past, cholecystectomy in the past, left fifth toe x2 hammertoe surgery, right knee meniscal repair, breast augmentation in 2002.  Family history is that she is adopted but biologic mother had a history of hep b and more recently learned of RA/ autoimmune disorders.  Social history lives with daughter/niece 16/13 with autism and has son who is 6-year-old child with mild cerebral palsy. Not a smoker.  Infrequent alcohol.  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  Allergies none.  Last major diagnostic procedures/hospitalization was in arthritis third 2015.  Note mentions that the patient's Sjogren's arthritis is also accompanied by possible autoimmune hepatitis and has a history of hep B felt to be related from vertical transmission from both mother.    They mentioned she has not been on CellCept.  In the notes she knew that she saw us many years ago.  Recent labs showed sed rate 19, aldolase 6.2, CPK 68, gamma GTP 40, WBC 6.4 hemoglobin 12.6 platelet count 272 MCV 94.9 neutrophils 3392 and lymphocytes 35.9.  Sodium 139 potassium 4.0 glucose 88 BUN 13 creatinine 0.8 calcium 9.5 bilirubin 0.6 alk phos 61 AST 56  albumin 4.5  She says for 3m just seeing Dr Alicia and so is establishing with her and says maybe for the last 3-4 m labs have been up and so that is being.  She denied turmeric or green tea or gsm.  She says 8 yrs ago she had a flare and that led to referral then and when seen not felt due to aih and maybe due to hep and labs settled down.   Weight listed as being had a BMI of 24.54.   September 2017 labs showed glucose 88 BUN of 7 creatinine 0.73 sodium 141 potassium 3.8 calcium 9.5 albumin 4.2 bilirubin 0.6 alk phos 59 AST 19 ALT 21 WBC 5.7 hemoglobin 13.1 plate count 233 MCV 95.5. Last visit note that I could find was from September 27, 2017 Harris states that she had the history of the hepatitis B.  Was on.  At that time.  Hep B level was 550 then AST 19 ALT 21 bilirubin 0.6 alk phos 59  Notes mention that she had asthma and was using however, and that her blood pressure has been variable at that time.    Had previously seen Dr. CHEN for irritable bowel.  Hep A immunity had been seen.  Reported initial biopsy stage III in the past.   Colonoscopy with polyp in 2015.  Plan 1. Take the tdf with food. 2. Do labs in 2 and 4 and 6 weeks. 3. DO telemed 7-8 weeks to see how doing. 4. Let us know if issues, 5. If has toxicity to tdf then can apply to tdf.  Duration of the visit was  minutes with 15 minutes of chart prep loaing the labs and u.s and contact notes with pt for the visit and then 16 minutes by hayden rocha today for the visit reviewing recent records current status and future plans for the patient.

## 2023-10-12 ENCOUNTER — TELEPHONE ENCOUNTER (OUTPATIENT)
Dept: URBAN - METROPOLITAN AREA CLINIC 86 | Facility: CLINIC | Age: 43
End: 2023-10-12

## 2023-10-12 NOTE — HPI-TODAY'S VISIT:
Dear Apryl Haley, Local labs dated October 9 sent into us today. Sodium was 140 potassium 3.6 chloride 107 CO2 of 26 BUN of 10 creatinine 0.8 total protein 7.4 bilirubin 0.9 alk phos 50 AST 55 and ALT 97 with a calcium 9.5. Prior labs to be saw in August 15 had shown then an alk phos of 49, AST of 75,  so these are lower.  Your creatinine had been 0.8 so that appears to be stable.   Need to see what the hep B DNA is doing tocompare to. Dr. Malone

## 2023-10-17 ENCOUNTER — CLAIMS CREATED FROM THE CLAIM WINDOW (OUTPATIENT)
Dept: URBAN - METROPOLITAN AREA TELEHEALTH 2 | Facility: TELEHEALTH | Age: 43
End: 2023-10-17
Payer: OTHER GOVERNMENT

## 2023-10-17 DIAGNOSIS — G51.0 BELL'S PALSY: ICD-10-CM

## 2023-10-17 DIAGNOSIS — E55.9 VITAMIN D DEFICIENCY: ICD-10-CM

## 2023-10-17 DIAGNOSIS — B18.1 CHRONIC ACTIVE TYPE B VIRAL HEPATITIS: ICD-10-CM

## 2023-10-17 DIAGNOSIS — K58.9 IRRITABLE BOWEL SYNDROME: ICD-10-CM

## 2023-10-17 DIAGNOSIS — Z79.899 HIGH RISK MEDICATION USE: ICD-10-CM

## 2023-10-17 DIAGNOSIS — M35.00 SECONDARY SJOGREN'S SYNDROME: ICD-10-CM

## 2023-10-17 DIAGNOSIS — E03.9 HYPOTHYROIDISM, UNSPECIFIED TYPE: ICD-10-CM

## 2023-10-17 DIAGNOSIS — Z71.89 VACCINE COUNSELING: ICD-10-CM

## 2023-10-17 DIAGNOSIS — R76.9 ABNORMAL IMMUNOLOGICAL FINDING IN SERUM: ICD-10-CM

## 2023-10-17 DIAGNOSIS — U07.1 COVID: ICD-10-CM

## 2023-10-17 DIAGNOSIS — J45.909 ASTHMA: ICD-10-CM

## 2023-10-17 PROCEDURE — 99214 OFFICE O/P EST MOD 30 MIN: CPT | Performed by: PHYSICIAN ASSISTANT

## 2023-10-17 PROCEDURE — 99214 OFFICE O/P EST MOD 30 MIN: CPT

## 2023-10-17 RX ORDER — FLUOXETINE HYDROCHLORIDE 20 MG/1
1 CAPSULE CAPSULE ORAL ONCE A DAY
Status: ACTIVE | COMMUNITY

## 2023-10-17 RX ORDER — CHOLECALCIFEROL (VITAMIN D3) 50 MCG
1 TABLET TABLET ORAL ONCE A DAY
Status: ACTIVE | COMMUNITY

## 2023-10-17 RX ORDER — TELMISARTAN AND HYDROCHLOROTHIAZIDE 40; 12.5 MG/1; MG/1
1 TABLET TABLET ORAL ONCE A DAY
Status: ACTIVE | COMMUNITY

## 2023-10-17 RX ORDER — TENOFOVIR DISPROXIL FUMARATE 300 MG/1
1 TABLET TABLET ORAL ONCE A DAY
Qty: 30 | Refills: 0 | Status: ACTIVE | COMMUNITY
Start: 2023-07-24

## 2023-10-17 RX ORDER — LEVOTHYROXINE SODIUM 125 UG/1
1 TABLET IN THE MORNING ON AN EMPTY STOMACH TABLET ORAL ONCE A DAY
Status: ACTIVE | COMMUNITY

## 2023-10-17 RX ORDER — HYDROXYCHLOROQUINE SULFATE 200 MG/1
AS DIRECTED TABLET ORAL
Status: ACTIVE | COMMUNITY

## 2023-10-17 RX ORDER — CYCLOSPORINE 0.5 MG/ML
1 DROP INTO AFFECTED EYE EMULSION OPHTHALMIC TWICE A DAY
Status: ACTIVE | COMMUNITY

## 2023-10-17 RX ORDER — CALCIUM CARBONATE 300MG(750)
AS DIRECTED TABLET,CHEWABLE ORAL
Status: ACTIVE | COMMUNITY

## 2023-10-17 NOTE — HPI-TODAY'S VISIT:
This is a 43-year-old female last seen Aug 2023 and prior back in 2017 and being referred from the [facility] rheumatology Rossy Alicia to see us for hep b liver issues and on tdf now.  Copy of the note will be sent to referring providers.  10/17/23 labs Dear Apryl Haley, Local labs dated October 9 sent into us today. Sodium was 140 potassium 3.6 chloride 107 CO2 of 26 BUN of 10 creatinine 0.8 total protein 7.4 bilirubin 0.9 alk phos 50 AST 55 and ALT 97 with a calcium 9.5. Prior labs to be saw in August 15 had shown then an alk phos of 49, AST of 75,  so these are lower.  Your creatinine had been 0.8 so that appears to be stable.  hbv [id number], previously in august level was [id number]  asked about if missed doses and maybe 2-3  she did have a flare last month as well so could explain the labs     recap The 8/15/2023 labs were sent to me. The sodium 136 potassium 3.3, creatinine 0.8, bilirubin 0.8, alkaline phosphatase 49, AST 75, .  The IgA 388, IgG 1574, IgM 114.  The hepatitis B surface antigen is positive.  They did not run a hepatitis B viral DNA.  Previously though back in July the alkaline phosphatase is 49 and the AST 94, .  So the liver enzymes are definitely lower and i suspect the hbv dna is still pending and we will follow up when we have that result. I called and discussed these with you as  was out of the office but sending you this as well for you records.  Donna Moss PA-C Dear Apryl Haley, August 15 labs showed sodium 136 potassium low at 3.3 and you should have like a banana twice a day, or V8 juice twice a day or tomato juice twice a day for 3 days to see if that will correct that. Chloride 105 CO2 of 26 BUN of 7 creatinine 0.8. Sugar was 146 elevated and may be not fasting that day?  Please share with primary provider. Albumin normal 4.1, bilirubin 0.8 alk phos 49 AST 75 and .  Prior July labs had shown an AST of 94 and ALT of 202 so this appears to be coming down.  Prior creatinine was 0.79 so that is the same.  Previous hep B level as we talked about had been higher at 6.8 million so overall labs moving in the right direction there. Your hep B surface antigen was noted to be positive which again not a surprise that you have been chronically positive.  The hep B level as you saw yesterday had come back at 135,000 The immunoglobulins which are looking for other signs of immune issues showed that your IgG is normal at 1574 and IgM normal at 114 IgA normal at 388. WBC was 4.74, hemoglobin 12.9 and platelet count was 262.  Your MCV was a little bit up at 96.  Your MCH was a little low at 35. Neutrophils were 2.11 and lymphocytes were 2.21. I would redo the labs 2 weeks after these and then again in 4 weeks and then at that point if they remain stable we can slow down the labs after that.  Usually the first month or two is  that timeframe during which we need to carefully watch and look for any flares or changes on the labs. Dr Faisal Haley, Curiously they did send this to the [facility] lab that you did previously on August 15.  It was as you stated to 135,000 for the hepatitis B virus level. The July virus level was 6.8 million so clearly lower now. Need to follow this over time. Dr. Faisal Haley, More labs sent in from August 15, 2023 showing that the hep B surface antigen was positive which is not surprising.  We will see what the DNA is doing with the treatment. Dr Faisal Haley, August 15 labs were sent in showing that your IgA was normal at 388, IgG normal at 1574, and IgM normal at 114. Other labs showed sodium 136 potassium 3.3 chloride 105 CO2 26 BUN of 7 creatinine 0.8 and glucose was elevated at 146 and possibly you were not fasting that day?  Albumin was normal at 4.1, bilirubin 0.8, alk phos 49 and AST 75 and  with calcium 9.5. WBC 4.74, hemoglobin 12.9, platelet count 262 with an MCV of 96 which is elevated.  Neutrophils 2.11 and lymphocytes 2.21 normal. As you recall, you had recently started the tenofovir DF and your July 14 at labs had shown an AST that was 94 and an ALT of 202 which were higher so these are coming down. We really need to see what the hepatitis B level comes back. Your previous creatinine was 0.79 and this is approximate the same at 0.8. Please redo labs again for us again in another 2 weeks and 4 weeks. We hope to see these labs continue to drop more and they should on this medicine. Dr. aMlone  She did get the TDF and delivered to her and she has been on it for 4 days. Not able to get samples pre the treatment.  She may want to take it with food. She also comments size if larger and with her sjogrens some difficulty swallowing it and if persists may need to appeal for the much smaller vemlidy.  She says she was previously diagnosed AIH and was diagnosed not by bx but by labs and hbv is what we saw.  AIH has markers that suggest it but many diseases can generate the markers and even medicines as well. Many times a bx is needed to confirm.  July 24th ultrasound unfortunately came back after you and I spoke. Pancreas was unremarkable. Liver was homogeneous/even in echotexture with no discrete liver lesions. Liver vessels patent which was good to see. Common bile duct was normal at 3 mm. Right kidney 10.5 cm no hydronephrosis. Spleen normal at 9.8 cm. Splenic vessels were patent as well. Overall good to see that everything remains stable and lets get you started on the hepatitis B medicine and reassess issues.   July 18th: finally hbv labs showing hep B level at 6.8 million. July 14 labs showed sodium 138 potassium 3.6 chloride 106 CO2 26 BUN of 11 creatinine 0.79 total bilirubin 0.7 alk phos 49 AST of 94 .  Calcium 8.8.  Hep B surface antigen was confirmed positive.   Quantitative immunoglobulins showed IgG normal at 1417, IgM normal 119 and IgA normal at 343.  This suggests less likely for an immune driven event. WBC 4.71 hemoglobin 11.5 platelet count 245 with an MCV elevated slightly at 99. The treatment criteria for hepatitis B is to have a DNA level over 2000, and an abnormal ALT over 2 review had both.  She needs to remain on TDF to control the hep b and can then avoid worsening other isp.  We do need to follow labs in 2 and 4 and 6 weeks and look for an immune flare as drop the viral load can see.  Per the notes: Patient apparently has a history of Sjogren's syndrome with inflammatory arthritis and also reported history of autoimmune hepatitis.  I did not see hep b mentioned in the info that we saw.  Patient listed as being on a variety of medicines including artificial tears as needed, magnesium 400 mg twice daily, fish oil 1000 mg a day, vitamin D 10,000 units a day for 1 month and then 2000 units a day, she has a copper IUD, Prozac 20 mg a day, Synthroid 125 mcg a day, hydrochlorothiazide telmisartan 12.5/40 mg a day, and Plaquenil 200 mg a day.  She is on plaquenil for her rheum issues and also dxd no RA and re that she has not reseen rheum for that and seen by whitney monzon.  Rheumatoid factor can be false positive.  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  Surgical history includes thyroid lobectomy in the past, cholecystectomy in the past, left fifth toe x2 hammertoe surgery, right knee meniscal repair, breast augmentation in 2002.  Family history is that she is adopted but biologic mother had a history of hep b and more recently learned of RA/ autoimmune disorders.  Social history lives with daughter/niece 16/13 with autism and has son who is 6-year-old child with mild cerebral palsy. Not a smoker.  Infrequent alcohol.  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  Allergies none.  Last major diagnostic procedures/hospitalization was in arthritis third 2015.  Note mentions that the patient's Sjogren's arthritis is also accompanied by possible autoimmune hepatitis and has a history of hep B felt to be related from vertical transmission from both mother.    They mentioned she has not been on CellCept.  In the notes she knew that she saw us many years ago.  Recent labs showed sed rate 19, aldolase 6.2, CPK 68, gamma GTP 40, WBC 6.4 hemoglobin 12.6 platelet count 272 MCV 94.9 neutrophils 3392 and lymphocytes 35.9.  Sodium 139 potassium 4.0 glucose 88 BUN 13 creatinine 0.8 calcium 9.5 bilirubin 0.6 alk phos 61 AST 56  albumin 4.5  She says for 3m just seeing Dr Alicia and so is establishing with her and says maybe for the last 3-4 m labs have been up and so that is being.  She denied turmeric or green tea or gsm.  She says 8 yrs ago she had a flare and that led to referral then and when seen not felt due to aih and maybe due to hep and labs settled down.   Weight listed as being had a BMI of 24.54.   September 2017 labs showed glucose 88 BUN of 7 creatinine 0.73 sodium 141 potassium 3.8 calcium 9.5 albumin 4.2 bilirubin 0.6 alk phos 59 AST 19 ALT 21 WBC 5.7 hemoglobin 13.1 plate count 233 MCV 95.5. Last visit note that I could find was from September 27, 2017 Harris states that she had the history of the hepatitis B.  Was on.  At that time.  Hep B level was 550 then AST 19 ALT 21 bilirubin 0.6 alk phos 59  Notes mention that she had asthma and was using however, and that her blood pressure has been variable at that time.    Had previously seen Dr. CHEN for irritable bowel.  Hep A immunity had been seen.  Reported initial biopsy stage III in the past.   Colonoscopy with polyp in 2015.

## 2023-10-17 NOTE — PHYSICAL EXAM MUSCULOSKELETAL:
Date of Service: 9/7/2022    YOB: normal gait and station , round, reactive to light and accommodation. Oropharynx clear. Nasal mucosa and lips without ulcerations.   NECK: Supple and nontender. No cervical or supraclavicular lymphadenopathy.   CARDIOVASCULAR: Regular rate and rhythm. Normal S1, S2. No murmurs or rubs. The temporal arteries were intact and palpable without any tenderness, cords, or other changes. The rest of the cardiovascular exam was within normal limits.  LUNGS: Clear to auscultation. No rales or wheezing. Breathing is unlabored.    EXTREMITIES: No edema, cyanosis, or clubbing.   SKIN: No rashes, digital ulcers, periungual erythema, nail pitting, nodules, or sclerodactyly.   MUSCULOSKELETAL:  Full range of motion of the neck; full range of motion of the bilateral shoulders, bilateral elbows, and bilateral wrists. Small osteoarthritic nodules of the left CMC1 joint. No subcutaneous nodules, synovitis or nail changes.  Full range of motion of the bilateral hips; full range of motion of the bilateral knees with 1+ crepitus bilaterally; full range of motion of the bilateral ankles; full range of motion of the MTPs. There are no signs of synovitis, effusions, or Baker's cyst.   SPINE: No tenderness, normal range of motion.   NEUROLOGICAL: Sensation intact. Cranial nerves 2-12 intact. The rest of the neurologic exam was within normal limits.  PSYCHOLOGICAL: Alert and oriented x3. Mood and affect are normal.     LABORATORY/IMAGING DATA:   Component      Latest Ref Rng & Units 6/24/2022   WBC      4.2 - 11.0 K/mcL 7.8   RBC      4.00 - 5.20 mil/mcL 4.03   HGB      12.0 - 15.5 g/dL 12.3   HCT      36.0 - 46.5 % 36.5   MCV      78.0 - 100.0 fl 90.6   MCH      26.0 - 34.0 pg 30.5   MCHC      32.0 - 36.5 g/dL 33.7   RDW-CV      11.0 - 15.0 % 13.0   RDW-SD      39.0 - 50.0 fL 44.0   PLT      140 - 450 K/mcL 264   Neutrophil      % 59   LYMPH      % 27   MONO      % 8   EOSIN      % 4   BASO      % 1   Immature Granulocytes      % 1   Absolute Neutrophil      1.8 - 7.7  K/mcL 4.7   Absolute Lymph      1.0 - 4.0 K/mcL 2.1   Absolute Mono      0.3 - 0.9 K/mcL 0.6   Absolute Eos      0.0 - 0.5 K/mcL 0.3   Absolute Baso      0.0 - 0.3 K/mcL 0.0   Absolute Immature Granulocytes      0.0 - 0.2 K/mcL 0.1   Albumin      3.6 - 5.1 g/dL 3.8   TOTAL BILIRUBIN      0.2 - 1.0 mg/dL 0.5   DIRECT BILIRUBIN      0.0 - 0.2 mg/dL 0.1   ALK PHOSPHATASE      45 - 117 Units/L 64   ALT/SGPT      <64 Units/L 40   AST/SGOT      <=37 Units/L 29   TOTAL PROTEIN      6.4 - 8.2 g/dL 7.0   Creatinine      0.51 - 0.95 mg/dL 0.97 (H)   Glomerular Filtration Rate      >=60 63   C-REACTIVE PROTEIN      <=1.0 mg/dL <0.3       ASSESSMENT/PLAN:      Ankylosing spondylitis:  She has noticed some clinical benefit on the medication.     Diarrhea: This may be related to Xeljanz.  For now I recommend that she try Imodium over-the-counter for 2 weeks.  If her symptoms continue I recommend we discontinue the Xeljanz and give a trial of Rinvoq.    Chronic back pain:  I appreciate the input from pain management.     I will place an order for routine laboratory values for today. I reviewed the side effects of all medications prescribed by me as listed in the physician's desk reference and in the package inserts.  Other reasonable and generally accepted treatment options, including the option to do nothing at all, were discussed. The patient was instructed by me to contact our office if the symptoms have not improved, worsened, or if there are any issues/concerns. The patient will return to clinic in 7 months.

## 2023-10-24 ENCOUNTER — TELEPHONE ENCOUNTER (OUTPATIENT)
Dept: URBAN - METROPOLITAN AREA CLINIC 86 | Facility: CLINIC | Age: 43
End: 2023-10-24

## 2023-10-24 ENCOUNTER — LAB OUTSIDE AN ENCOUNTER (OUTPATIENT)
Dept: URBAN - METROPOLITAN AREA TELEHEALTH 2 | Facility: TELEHEALTH | Age: 43
End: 2023-10-24

## 2023-10-24 RX ORDER — TENOFOVIR DISPROXIL FUMARATE 300 MG/1
1 TABLET TABLET ORAL ONCE A DAY
Qty: 30 | Refills: 2
Start: 2023-07-24

## 2023-11-14 ENCOUNTER — LAB OUTSIDE AN ENCOUNTER (OUTPATIENT)
Dept: URBAN - METROPOLITAN AREA TELEHEALTH 2 | Facility: TELEHEALTH | Age: 43
End: 2023-11-14

## 2023-12-04 ENCOUNTER — OFFICE VISIT (OUTPATIENT)
Dept: URBAN - METROPOLITAN AREA TELEHEALTH 2 | Facility: TELEHEALTH | Age: 43
End: 2023-12-04

## 2023-12-04 RX ORDER — CHOLECALCIFEROL (VITAMIN D3) 50 MCG
1 TABLET TABLET ORAL ONCE A DAY
COMMUNITY

## 2023-12-04 RX ORDER — FLUOXETINE HYDROCHLORIDE 20 MG/1
1 CAPSULE CAPSULE ORAL ONCE A DAY
COMMUNITY

## 2023-12-04 RX ORDER — HYDROXYCHLOROQUINE SULFATE 200 MG/1
AS DIRECTED TABLET ORAL
COMMUNITY

## 2023-12-04 RX ORDER — TELMISARTAN AND HYDROCHLOROTHIAZIDE 40; 12.5 MG/1; MG/1
1 TABLET TABLET ORAL ONCE A DAY
COMMUNITY

## 2023-12-04 RX ORDER — CALCIUM CARBONATE 300MG(750)
AS DIRECTED TABLET,CHEWABLE ORAL
COMMUNITY

## 2023-12-04 RX ORDER — CYCLOSPORINE 0.5 MG/ML
1 DROP INTO AFFECTED EYE EMULSION OPHTHALMIC TWICE A DAY
COMMUNITY

## 2023-12-04 RX ORDER — LEVOTHYROXINE SODIUM 125 UG/1
1 TABLET IN THE MORNING ON AN EMPTY STOMACH TABLET ORAL ONCE A DAY
COMMUNITY

## 2023-12-04 RX ORDER — TENOFOVIR DISPROXIL FUMARATE 300 MG/1
1 TABLET TABLET ORAL ONCE A DAY
Qty: 30 | Refills: 2 | COMMUNITY
Start: 2023-07-24

## 2023-12-17 ENCOUNTER — LAB OUTSIDE AN ENCOUNTER (OUTPATIENT)
Dept: URBAN - METROPOLITAN AREA TELEHEALTH 2 | Facility: TELEHEALTH | Age: 43
End: 2023-12-17

## 2023-12-19 ENCOUNTER — TELEPHONE ENCOUNTER (OUTPATIENT)
Dept: URBAN - METROPOLITAN AREA CLINIC 86 | Facility: CLINIC | Age: 43
End: 2023-12-19

## 2023-12-28 ENCOUNTER — TELEPHONE ENCOUNTER (OUTPATIENT)
Dept: URBAN - METROPOLITAN AREA CLINIC 96 | Facility: CLINIC | Age: 43
End: 2023-12-28

## 2023-12-29 ENCOUNTER — CLAIMS CREATED FROM THE CLAIM WINDOW (OUTPATIENT)
Dept: URBAN - METROPOLITAN AREA TELEHEALTH 2 | Facility: TELEHEALTH | Age: 43
End: 2023-12-29
Payer: OTHER GOVERNMENT

## 2023-12-29 ENCOUNTER — TELEPHONE ENCOUNTER (OUTPATIENT)
Dept: URBAN - METROPOLITAN AREA CLINIC 96 | Facility: CLINIC | Age: 43
End: 2023-12-29

## 2023-12-29 ENCOUNTER — LAB OUTSIDE AN ENCOUNTER (OUTPATIENT)
Dept: URBAN - METROPOLITAN AREA TELEHEALTH 2 | Facility: TELEHEALTH | Age: 43
End: 2023-12-29

## 2023-12-29 VITALS — BODY MASS INDEX: 23.3 KG/M2 | HEIGHT: 66 IN | WEIGHT: 145 LBS

## 2023-12-29 DIAGNOSIS — M19.90 ARTHRITIS: ICD-10-CM

## 2023-12-29 DIAGNOSIS — Z86.010 PERSONAL HISTORY OF COLONIC POLYPS: ICD-10-CM

## 2023-12-29 DIAGNOSIS — Z90.49 HISTORY OF CHOLECYSTECTOMY: ICD-10-CM

## 2023-12-29 DIAGNOSIS — K58.0 IRRITABLE BOWEL SYNDROME WITH DIARRHEA: ICD-10-CM

## 2023-12-29 DIAGNOSIS — B18.1 CHRONIC HEPATITIS B: ICD-10-CM

## 2023-12-29 PROBLEM — 428882003: Status: ACTIVE | Noted: 2023-12-29

## 2023-12-29 PROBLEM — 197125005: Status: ACTIVE | Noted: 2023-12-29

## 2023-12-29 PROBLEM — 428283002: Status: ACTIVE | Noted: 2023-12-29

## 2023-12-29 PROBLEM — 61977001: Status: ACTIVE | Noted: 2023-12-29

## 2023-12-29 PROCEDURE — 99204 OFFICE O/P NEW MOD 45 MIN: CPT | Performed by: INTERNAL MEDICINE

## 2023-12-29 PROCEDURE — 99214 OFFICE O/P EST MOD 30 MIN: CPT | Performed by: INTERNAL MEDICINE

## 2023-12-29 RX ORDER — CHOLECALCIFEROL (VITAMIN D3) 50 MCG
1 TABLET TABLET ORAL ONCE A DAY
Status: ACTIVE | COMMUNITY

## 2023-12-29 RX ORDER — TELMISARTAN AND HYDROCHLOROTHIAZIDE 40; 12.5 MG/1; MG/1
1 TABLET TABLET ORAL ONCE A DAY
Status: ACTIVE | COMMUNITY

## 2023-12-29 RX ORDER — CALCIUM CARBONATE 300MG(750)
AS DIRECTED TABLET,CHEWABLE ORAL
Status: ACTIVE | COMMUNITY

## 2023-12-29 RX ORDER — ONDANSETRON HYDROCHLORIDE 4 MG/1
2 TABLETS TABLET, FILM COATED ORAL
Qty: 2 TABLETS | Refills: 0 | OUTPATIENT
Start: 2023-12-29

## 2023-12-29 RX ORDER — FLUOXETINE HYDROCHLORIDE 20 MG/1
1 CAPSULE CAPSULE ORAL ONCE A DAY
Status: ACTIVE | COMMUNITY

## 2023-12-29 RX ORDER — COLESEVELAM HYDROCHLORIDE 625 MG/1
ONE TABLET TABLET, COATED ORAL
Qty: 60 TABLETS | Refills: 1 | OUTPATIENT
Start: 2023-12-29

## 2023-12-29 RX ORDER — HYDROXYCHLOROQUINE SULFATE 200 MG/1
AS DIRECTED TABLET ORAL
Status: ACTIVE | COMMUNITY

## 2023-12-29 RX ORDER — POLYETHYLENE GLYCOL 3350, SODIUM SULFATE ANHYDROUS, SODIUM BICARBONATE, SODIUM CHLORIDE, POTASSIUM CHLORIDE 236; 22.74; 6.74; 5.86; 2.97 G/4L; G/4L; G/4L; G/4L; G/4L
AS DIRECTED POWDER, FOR SOLUTION ORAL
Qty: 1 | Refills: 0 | OUTPATIENT
Start: 2023-12-29 | End: 2023-12-30

## 2023-12-29 RX ORDER — TENOFOVIR DISPROXIL FUMARATE 300 MG/1
1 TABLET TABLET ORAL ONCE A DAY
Qty: 30 | Refills: 2 | Status: ACTIVE | COMMUNITY
Start: 2023-07-24

## 2023-12-29 RX ORDER — CYCLOSPORINE 0.5 MG/ML
1 DROP INTO AFFECTED EYE EMULSION OPHTHALMIC TWICE A DAY
Status: ACTIVE | COMMUNITY

## 2023-12-29 RX ORDER — LEVOTHYROXINE SODIUM 125 UG/1
1 TABLET IN THE MORNING ON AN EMPTY STOMACH TABLET ORAL ONCE A DAY
Status: ACTIVE | COMMUNITY

## 2023-12-29 NOTE — HPI-TODAY'S VISIT:
This is a 43-year-old female referred for GI consultation for colon cancer screening and IBS and a copy will be sent to the referring provider.  I saw her back in 2016 for a second opinion for IBS at the time.  She also sees our liver center for chroic Hep B.  She at that time stated that she had a colonoscopy done that showed polyps in [facility].  She was taking Viberzi at the time for IBS diarrhea.  She had a follow-up for her IBS and was seen by her PA Cristel in 2017.  She was on Celexa at the time for IBS and had stopped Viberzi.  Cristel had ordered a colonoscopy because of history of colon polyps.  It does not appear that she did the procedure then however.  Patient most recently saw Yajaira Moss our PA in the liver center in December for follow-up visit.  Patient has rheumatology issues and is on Plaquenil she apparently has a history of Sjogren's as well.  Patient is adopted. Patient's colonoscopy report was reviewed from June 2019.  This was done by Dr. Sethi ([place], [place]) and she had two 5 mm transverse colon polyps that were removed.  I do not have the pathology of the polyps. Pt denies constipation. Pt is on prozac now. Pt goes about 3x a day and goes after meals and sometimes lately she has urgency. Pt retired from . Pt had a cholecytectomy so cant take viberzi. Her IBS she said was ok after she retired from  but lately over the last few yrs, she has urgency with the diarrhea, so she watches to have bathroom nearby. Pt has Sjogrens and arthritis but denies cardiopulm issues. PT wanted to set up her colon and does not want to go back to prior doctor. Says she was told to do 3yrs after last exam.

## 2024-01-02 ENCOUNTER — TELEPHONE ENCOUNTER (OUTPATIENT)
Dept: URBAN - METROPOLITAN AREA CLINIC 96 | Facility: CLINIC | Age: 44
End: 2024-01-02

## 2024-01-04 ENCOUNTER — TELEPHONE ENCOUNTER (OUTPATIENT)
Dept: URBAN - METROPOLITAN AREA CLINIC 86 | Facility: CLINIC | Age: 44
End: 2024-01-04

## 2024-01-04 NOTE — HPI-TODAY'S VISIT:
Dear Apryl Haley,   The 12/27/2023 were found in epic. The sodium 138, potassium 3.4, bilirubin 0.8, alkaline phosphatase 46, AST 23, ALT 29.Hepatitis B viral .Complete blood count showing white blood cells 4.86, hemoglobin 13, MCV 94, platelets 278. Prior 10/9/23 labs with Sodium was 140 potassium 3.6 chloride 107 CO2 of 26 BUN of 10 creatinine 0.8 total protein 7.4 bilirubin 0.9 alk phos 50 AST 55 and ALT 97 with a calcium 9.5. Prior labs to be saw in August 15 had shown then an alk phos of 49, AST of 75,  so these are lower. HBV prior was [id number] so lower now. Happy to see this.   Donna Moss PA-C

## 2024-01-05 ENCOUNTER — OFFICE VISIT (OUTPATIENT)
Dept: URBAN - METROPOLITAN AREA TELEHEALTH 2 | Facility: TELEHEALTH | Age: 44
End: 2024-01-05
Payer: OTHER GOVERNMENT

## 2024-01-05 ENCOUNTER — DASHBOARD ENCOUNTERS (OUTPATIENT)
Age: 44
End: 2024-01-05

## 2024-01-05 ENCOUNTER — TELEPHONE ENCOUNTER (OUTPATIENT)
Dept: URBAN - METROPOLITAN AREA CLINIC 86 | Facility: CLINIC | Age: 44
End: 2024-01-05

## 2024-01-05 DIAGNOSIS — K58.0 IRRITABLE BOWEL SYNDROME WITH DIARRHEA: ICD-10-CM

## 2024-01-05 DIAGNOSIS — Z86.010 PERSONAL HISTORY OF COLONIC POLYPS: ICD-10-CM

## 2024-01-05 DIAGNOSIS — B18.1 CHRONIC ACTIVE TYPE B VIRAL HEPATITIS: ICD-10-CM

## 2024-01-05 DIAGNOSIS — E55.9 VITAMIN D DEFICIENCY: ICD-10-CM

## 2024-01-05 PROCEDURE — 99214 OFFICE O/P EST MOD 30 MIN: CPT | Performed by: PHYSICIAN ASSISTANT

## 2024-01-05 RX ORDER — TELMISARTAN AND HYDROCHLOROTHIAZIDE 40; 12.5 MG/1; MG/1
1 TABLET TABLET ORAL ONCE A DAY
Status: ACTIVE | COMMUNITY

## 2024-01-05 RX ORDER — CYCLOSPORINE 0.5 MG/ML
1 DROP INTO AFFECTED EYE EMULSION OPHTHALMIC TWICE A DAY
Status: ACTIVE | COMMUNITY

## 2024-01-05 RX ORDER — HYDROXYCHLOROQUINE SULFATE 200 MG/1
AS DIRECTED TABLET ORAL
Status: ACTIVE | COMMUNITY

## 2024-01-05 RX ORDER — FLUOXETINE HYDROCHLORIDE 20 MG/1
1 CAPSULE CAPSULE ORAL ONCE A DAY
Status: ACTIVE | COMMUNITY

## 2024-01-05 RX ORDER — LEVOTHYROXINE SODIUM 125 UG/1
1 TABLET IN THE MORNING ON AN EMPTY STOMACH TABLET ORAL ONCE A DAY
Status: ACTIVE | COMMUNITY

## 2024-01-05 RX ORDER — ONDANSETRON HYDROCHLORIDE 4 MG/1
2 TABLETS TABLET, FILM COATED ORAL
Qty: 2 TABLETS | Refills: 0 | Status: ACTIVE | COMMUNITY
Start: 2023-12-29

## 2024-01-05 RX ORDER — COLESEVELAM HYDROCHLORIDE 625 MG/1
ONE TABLET TABLET, COATED ORAL
Qty: 60 TABLETS | Refills: 1 | Status: ACTIVE | COMMUNITY
Start: 2023-12-29

## 2024-01-05 RX ORDER — CALCIUM CARBONATE 300MG(750)
AS DIRECTED TABLET,CHEWABLE ORAL
Status: ACTIVE | COMMUNITY

## 2024-01-05 RX ORDER — CHOLECALCIFEROL (VITAMIN D3) 50 MCG
1 TABLET TABLET ORAL ONCE A DAY
Status: ACTIVE | COMMUNITY

## 2024-01-05 RX ORDER — TENOFOVIR DISPROXIL FUMARATE 300 MG/1
1 TABLET TABLET ORAL ONCE A DAY
Qty: 30 | Refills: 2 | Status: ACTIVE | COMMUNITY
Start: 2023-07-24

## 2024-01-05 NOTE — HPI-TODAY'S VISIT:
This is a 43-year-old female last seen Aug 2023 and prior back in 2017 and being referred from the [facility] rheumatology Rossy Alicia to see us for hep b liver issues and on tdf now.  Copy of the note will be sent to referring providers.  1/5/24 telemed Dear Apryl Haley,   The 12/27/2023 were found in epic. The sodium 138, potassium 3.4, bilirubin 0.8, alkaline phosphatase 46, AST 23, ALT 29.Hepatitis B viral .Complete blood count showing white blood cells 4.86, hemoglobin 13, MCV 94, platelets 278. Prior 10/9/23 labs with Sodium was 140 potassium 3.6 chloride 107 CO2 of 26 BUN of 10 creatinine 0.8 total protein 7.4 bilirubin 0.9 alk phos 50 AST 55 and ALT 97 with a calcium 9.5. Prior labs to be saw in August 15 had shown then an alk phos of 49, AST of 75,  so these are lower. HBV prior was [id number] so lower now. Happy to see this.   still taking it at night--it causes nausea so this is better for her.  she is feeling well  no missed doses  she needs us due in jan 2024.   recap 10/17/23 labs Dear Apryl Haley, Local labs dated October 9 sent into us today. Sodium was 140 potassium 3.6 chloride 107 CO2 of 26 BUN of 10 creatinine 0.8 total protein 7.4 bilirubin 0.9 alk phos 50 AST 55 and ALT 97 with a calcium 9.5. Prior labs to be saw in August 15 had shown then an alk phos of 49, AST of 75,  so these are lower.  Your creatinine had been 0.8 so that appears to be stable.  hbv [id number], previously in august level was [id number]  asked about if missed doses and maybe 2-3  she did have a flare last month as well so could explain the labs     recap The 8/15/2023 labs were sent to me. The sodium 136 potassium 3.3, creatinine 0.8, bilirubin 0.8, alkaline phosphatase 49, AST 75, .  The IgA 388, IgG 1574, IgM 114.  The hepatitis B surface antigen is positive.  They did not run a hepatitis B viral DNA.  Previously though back in July the alkaline phosphatase is 49 and the AST 94, .  So the liver enzymes are definitely lower and i suspect the hbv dna is still pending and we will follow up when we have that result. I called and discussed these with you as  was out of the office but sending you this as well for you records.  Donna Moss PA-C Dear Apryl Haley, August 15 labs showed sodium 136 potassium low at 3.3 and you should have like a banana twice a day, or V8 juice twice a day or tomato juice twice a day for 3 days to see if that will correct that. Chloride 105 CO2 of 26 BUN of 7 creatinine 0.8. Sugar was 146 elevated and may be not fasting that day?  Please share with primary provider. Albumin normal 4.1, bilirubin 0.8 alk phos 49 AST 75 and .  Prior July labs had shown an AST of 94 and ALT of 202 so this appears to be coming down.  Prior creatinine was 0.79 so that is the same.  Previous hep B level as we talked about had been higher at 6.8 million so overall labs moving in the right direction there. Your hep B surface antigen was noted to be positive which again not a surprise that you have been chronically positive.  The hep B level as you saw yesterday had come back at 135,000 The immunoglobulins which are looking for other signs of immune issues showed that your IgG is normal at 1574 and IgM normal at 114 IgA normal at 388. WBC was 4.74, hemoglobin 12.9 and platelet count was 262.  Your MCV was a little bit up at 96.  Your MCH was a little low at 35. Neutrophils were 2.11 and lymphocytes were 2.21. I would redo the labs 2 weeks after these and then again in 4 weeks and then at that point if they remain stable we can slow down the labs after that.  Usually the first month or two is  that timeframe during which we need to carefully watch and look for any flares or changes on the labs. Dr Malone Dear Apryl Haley, Curiously they did send this to the [facility] lab that you did previously on August 15.  It was as you stated to 135,000 for the hepatitis B virus level. The July virus level was 6.8 million so clearly lower now. Need to follow this over time. Dr. Malone Dear Apryl Haley, More labs sent in from August 15, 2023 showing that the hep B surface antigen was positive which is not surprising.  We will see what the DNA is doing with the treatment. Dr Malone Dear Apryl Haley, August 15 labs were sent in showing that your IgA was normal at 388, IgG normal at 1574, and IgM normal at 114. Other labs showed sodium 136 potassium 3.3 chloride 105 CO2 26 BUN of 7 creatinine 0.8 and glucose was elevated at 146 and possibly you were not fasting that day?  Albumin was normal at 4.1, bilirubin 0.8, alk phos 49 and AST 75 and  with calcium 9.5. WBC 4.74, hemoglobin 12.9, platelet count 262 with an MCV of 96 which is elevated.  Neutrophils 2.11 and lymphocytes 2.21 normal. As you recall, you had recently started the tenofovir DF and your July 14 at labs had shown an AST that was 94 and an ALT of 202 which were higher so these are coming down. We really need to see what the hepatitis B level comes back. Your previous creatinine was 0.79 and this is approximate the same at 0.8. Please redo labs again for us again in another 2 weeks and 4 weeks. We hope to see these labs continue to drop more and they should on this medicine. Dr. Malone  She did get the TDF and delivered to her and she has been on it for 4 days. Not able to get samples pre the treatment.  She may want to take it with food. She also comments size if larger and with her sjogrens some difficulty swallowing it and if persists may need to appeal for the much smaller vemlidy.  She says she was previously diagnosed AIH and was diagnosed not by bx but by labs and hbv is what we saw.  AIH has markers that suggest it but many diseases can generate the markers and even medicines as well. Many times a bx is needed to confirm.  July 24th ultrasound unfortunately came back after you and I spoke. Pancreas was unremarkable. Liver was homogeneous/even in echotexture with no discrete liver lesions. Liver vessels patent which was good to see. Common bile duct was normal at 3 mm. Right kidney 10.5 cm no hydronephrosis. Spleen normal at 9.8 cm. Splenic vessels were patent as well. Overall good to see that everything remains stable and lets get you started on the hepatitis B medicine and reassess issues.   July 18th: finally hbv labs showing hep B level at 6.8 million. July 14 labs showed sodium 138 potassium 3.6 chloride 106 CO2 26 BUN of 11 creatinine 0.79 total bilirubin 0.7 alk phos 49 AST of 94 .  Calcium 8.8.  Hep B surface antigen was confirmed positive.   Quantitative immunoglobulins showed IgG normal at 1417, IgM normal 119 and IgA normal at 343.  This suggests less likely for an immune driven event. WBC 4.71 hemoglobin 11.5 platelet count 245 with an MCV elevated slightly at 99. The treatment criteria for hepatitis B is to have a DNA level over 2000, and an abnormal ALT over 2 review had both.  She needs to remain on TDF to control the hep b and can then avoid worsening other isp.  We do need to follow labs in 2 and 4 and 6 weeks and look for an immune flare as drop the viral load can see.  Per the notes: Patient apparently has a history of Sjogren's syndrome with inflammatory arthritis and also reported history of autoimmune hepatitis.  I did not see hep b mentioned in the info that we saw.  Patient listed as being on a variety of medicines including artificial tears as needed, magnesium 400 mg twice daily, fish oil 1000 mg a day, vitamin D 10,000 units a day for 1 month and then 2000 units a day, she has a copper IUD, Prozac 20 mg a day, Synthroid 125 mcg a day, hydrochlorothiazide telmisartan 12.5/40 mg a day, and Plaquenil 200 mg a day.  She is on plaquenil for her rheum issues and also dxd no RA and re that she has not reseen rheum for that and seen by whitney monzon.  Rheumatoid factor can be false positive.  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  Surgical history includes thyroid lobectomy in the past, cholecystectomy in the past, left fifth toe x2 hammertoe surgery, right knee meniscal repair, breast augmentation in 2002.  Family history is that she is adopted but biologic mother had a history of hep b and more recently learned of RA/ autoimmune disorders.  Social history lives with daughter/niece 16/13 with autism and has son who is 6-year-old child with mild cerebral palsy. Not a smoker.  Infrequent alcohol.  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  Allergies none.  Last major diagnostic procedures/hospitalization was in arthritis third 2015.  Note mentions that the patient's Sjogren's arthritis is also accompanied by possible autoimmune hepatitis and has a history of hep B felt to be related from vertical transmission from both mother.    They mentioned she has not been on CellCept.  In the notes she knew that she saw us many years ago.  Recent labs showed sed rate 19, aldolase 6.2, CPK 68, gamma GTP 40, WBC 6.4 hemoglobin 12.6 platelet count 272 MCV 94.9 neutrophils 3392 and lymphocytes 35.9.  Sodium 139 potassium 4.0 glucose 88 BUN 13 creatinine 0.8 calcium 9.5 bilirubin 0.6 alk phos 61 AST 56  albumin 4.5  She says for 3m just seeing Dr Alicia and so is establishing with her and says maybe for the last 3-4 m labs have been up and so that is being.  She denied turmeric or green tea or gsm.  She says 8 yrs ago she had a flare and that led to referral then and when seen not felt due to aih and maybe due to hep and labs settled down.   Weight listed as being had a BMI of 24.54.   September 2017 labs showed glucose 88 BUN of 7 creatinine 0.73 sodium 141 potassium 3.8 calcium 9.5 albumin 4.2 bilirubin 0.6 alk phos 59 AST 19 ALT 21 WBC 5.7 hemoglobin 13.1 plate count 233 MCV 95.5. Last visit note that I could find was from September 27, 2017 Harris states that she had the history of the hepatitis B.  Was on.  At that time.  Hep B level was 550 then AST 19 ALT 21 bilirubin 0.6 alk phos 59  Notes mention that she had asthma and was using however, and that her blood pressure has been variable at that time.    Had previously seen Dr. CHEN for irritable bowel.  Hep A immunity had been seen.  Reported initial biopsy stage III in the past.   Colonoscopy with polyp in 2015.

## 2024-01-15 ENCOUNTER — LAB OUTSIDE AN ENCOUNTER (OUTPATIENT)
Dept: URBAN - METROPOLITAN AREA CLINIC 86 | Facility: CLINIC | Age: 44
End: 2024-01-15

## 2024-01-19 ENCOUNTER — TELEPHONE ENCOUNTER (OUTPATIENT)
Dept: URBAN - METROPOLITAN AREA CLINIC 86 | Facility: CLINIC | Age: 44
End: 2024-01-19

## 2024-01-19 RX ORDER — TENOFOVIR DISPROXIL FUMARATE 300 MG/1
1 TABLET TABLET ORAL ONCE A DAY
Qty: 30 | Refills: 2
Start: 2023-07-24

## 2024-01-24 ENCOUNTER — TELEPHONE ENCOUNTER (OUTPATIENT)
Dept: URBAN - METROPOLITAN AREA CLINIC 86 | Facility: CLINIC | Age: 44
End: 2024-01-24

## 2024-01-25 ENCOUNTER — LAB OUTSIDE AN ENCOUNTER (OUTPATIENT)
Dept: URBAN - METROPOLITAN AREA TELEHEALTH 2 | Facility: TELEHEALTH | Age: 44
End: 2024-01-25

## 2024-01-26 ENCOUNTER — OFFICE VISIT (OUTPATIENT)
Dept: URBAN - METROPOLITAN AREA CLINIC 91 | Facility: CLINIC | Age: 44
End: 2024-01-26

## 2024-02-09 ENCOUNTER — US W/ DOPP (OUTPATIENT)
Dept: URBAN - METROPOLITAN AREA CLINIC 91 | Facility: CLINIC | Age: 44
End: 2024-02-09
Payer: OTHER GOVERNMENT

## 2024-02-09 DIAGNOSIS — B18.1 CHRONIC ACTIVE TYPE B VIRAL HEPATITIS: ICD-10-CM

## 2024-02-09 PROCEDURE — 93975 VASCULAR STUDY: CPT

## 2024-02-09 PROCEDURE — 76705 ECHO EXAM OF ABDOMEN: CPT

## 2024-03-25 ENCOUNTER — LAB (OUTPATIENT)
Dept: URBAN - METROPOLITAN AREA TELEHEALTH 2 | Facility: TELEHEALTH | Age: 44
End: 2024-03-25

## 2024-04-04 ENCOUNTER — TELEP (OUTPATIENT)
Dept: URBAN - METROPOLITAN AREA TELEHEALTH 2 | Facility: TELEHEALTH | Age: 44
End: 2024-04-04

## 2024-04-17 ENCOUNTER — TELEP (OUTPATIENT)
Dept: URBAN - METROPOLITAN AREA TELEHEALTH 2 | Facility: TELEHEALTH | Age: 44
End: 2024-04-17

## 2024-04-17 ENCOUNTER — OV EP (OUTPATIENT)
Dept: URBAN - METROPOLITAN AREA CLINIC 86 | Facility: CLINIC | Age: 44
End: 2024-04-17

## 2024-05-01 ENCOUNTER — OFFICE VISIT (OUTPATIENT)
Dept: URBAN - METROPOLITAN AREA SURGERY CENTER 18 | Facility: SURGERY CENTER | Age: 44
End: 2024-05-01

## 2024-08-06 ENCOUNTER — OFFICE VISIT (OUTPATIENT)
Dept: URBAN - METROPOLITAN AREA TELEHEALTH 2 | Facility: TELEHEALTH | Age: 44
End: 2024-08-06
Payer: OTHER GOVERNMENT

## 2024-08-06 VITALS — WEIGHT: 135 LBS | HEIGHT: 66 IN | BODY MASS INDEX: 21.69 KG/M2

## 2024-08-06 DIAGNOSIS — K58.8 OTHER IRRITABLE BOWEL SYNDROME: ICD-10-CM

## 2024-08-06 DIAGNOSIS — B18.1 CHRONIC ACTIVE TYPE B VIRAL HEPATITIS: ICD-10-CM

## 2024-08-06 DIAGNOSIS — Z86.010 PERSONAL HISTORY OF COLONIC POLYPS: ICD-10-CM

## 2024-08-06 PROCEDURE — 99214 OFFICE O/P EST MOD 30 MIN: CPT | Performed by: PHYSICIAN ASSISTANT

## 2024-08-06 RX ORDER — TELMISARTAN AND HYDROCHLOROTHIAZIDE 40; 12.5 MG/1; MG/1
1 TABLET TABLET ORAL ONCE A DAY
Status: ACTIVE | COMMUNITY

## 2024-08-06 RX ORDER — HYDROXYCHLOROQUINE SULFATE 200 MG/1
AS DIRECTED TABLET ORAL
Status: ACTIVE | COMMUNITY

## 2024-08-06 RX ORDER — COLESEVELAM HYDROCHLORIDE 625 MG/1
ONE TABLET TABLET, COATED ORAL
Qty: 60 TABLETS | Refills: 1 | Status: ACTIVE | COMMUNITY
Start: 2023-12-29

## 2024-08-06 RX ORDER — LEVOTHYROXINE SODIUM 125 UG/1
1 TABLET IN THE MORNING ON AN EMPTY STOMACH TABLET ORAL ONCE A DAY
Status: ACTIVE | COMMUNITY

## 2024-08-06 RX ORDER — CYCLOSPORINE 0.5 MG/ML
1 DROP INTO AFFECTED EYE EMULSION OPHTHALMIC TWICE A DAY
Status: ACTIVE | COMMUNITY

## 2024-08-06 RX ORDER — FLUOXETINE HYDROCHLORIDE 20 MG/1
1 CAPSULE CAPSULE ORAL ONCE A DAY
Status: ACTIVE | COMMUNITY

## 2024-08-06 RX ORDER — TENOFOVIR ALAFENAMIDE 25 MG/1
1 TABLET WITH FOOD TABLET ORAL ONCE A DAY
Qty: 30 | Refills: 2 | OUTPATIENT
Start: 2024-08-06 | End: 2024-11-03

## 2024-08-06 RX ORDER — CHOLECALCIFEROL (VITAMIN D3) 50 MCG
1 TABLET TABLET ORAL ONCE A DAY
Status: ACTIVE | COMMUNITY

## 2024-08-06 RX ORDER — OMEPRAZOLE 40 MG/1
1 CAPSULE 30 MINUTES BEFORE MEAL CAPSULE, DELAYED RELEASE ORAL TWICE DAILY
Status: ACTIVE | COMMUNITY

## 2024-08-06 RX ORDER — TENOFOVIR DISPROXIL FUMARATE 300 MG/1
1 TABLET TABLET ORAL ONCE A DAY
Qty: 30 | Refills: 2 | Status: ACTIVE | COMMUNITY
Start: 2023-07-24

## 2024-08-06 RX ORDER — CALCIUM CARBONATE 300MG(750)
AS DIRECTED TABLET,CHEWABLE ORAL
Status: ACTIVE | COMMUNITY

## 2024-08-06 RX ORDER — ONDANSETRON HYDROCHLORIDE 4 MG/1
2 TABLETS TABLET, FILM COATED ORAL
Qty: 2 TABLETS | Refills: 0 | Status: ACTIVE | COMMUNITY
Start: 2023-12-29

## 2024-08-06 NOTE — HPI-TODAY'S VISIT:
his is a 43-year-old female last seen  Jan 2024 and prior back in 2017 and being referred from the [facility] rheumatology Rossy Alicia to see us for hep b liver issues and on tdf now.  Copy of the note will be sent to referring providers.  8/6/24 She is doing well.  The 2/9/24 ultrasound was sent to me.  The liver appeared homogeneous in echotexture that see lesions.  The hepatic vascular is patent.  Common bile duct is 5 mm, upper limits of normal in size.  Spleen 9.9 cm and this is normal.  Overall they noted that the hepatic vascular is patent and the liver appeared normal.  Overall this seems stable compared to the prior.  Will review at the follow-up. CARMINE Romero  [facility] and [facility] last done in may she is loosing weight and had colon and egd and had polyps and gastritis and seeing them she notes the viread causes a lot of nausea and loosing weight can try vemlidy she needs to send us labs  she has spondylitis now and dx with ra. seeing rheumatologist friday. on plaquenil bid(this is for her sjrogen and raynauds)  she will be seein [place] rheumatology she will be due in sept   recap 1/5/24 telemed Dear Apryl Haley,   The 12/27/2023 were found in epic. The sodium 138, potassium 3.4, bilirubin 0.8, alkaline phosphatase 46, AST 23, ALT 29.Hepatitis B viral .Complete blood count showing white blood cells 4.86, hemoglobin 13, MCV 94, platelets 278. Prior 10/9/23 labs with Sodium was 140 potassium 3.6 chloride 107 CO2 of 26 BUN of 10 creatinine 0.8 total protein 7.4 bilirubin 0.9 alk phos 50 AST 55 and ALT 97 with a calcium 9.5. Prior labs to be saw in August 15 had shown then an alk phos of 49, AST of 75,  so these are lower. HBV prior was [id number] so lower now. Happy to see this.   still taking it at night--it causes nausea so this is better for her.  she is feeling well  no missed doses  she needs us due in jan 2024.   recap 10/17/23 labs Dear Apryl Haley, Local labs dated October 9 sent into us today. Sodium was 140 potassium 3.6 chloride 107 CO2 of 26 BUN of 10 creatinine 0.8 total protein 7.4 bilirubin 0.9 alk phos 50 AST 55 and ALT 97 with a calcium 9.5. Prior labs to be saw in August 15 had shown then an alk phos of 49, AST of 75,  so these are lower.  Your creatinine had been 0.8 so that appears to be stable.  hbv [id number], previously in august level was [id number]  asked about if missed doses and maybe 2-3  she did have a flare last month as well so could explain the labs     recap The 8/15/2023 labs were sent to me. The sodium 136 potassium 3.3, creatinine 0.8, bilirubin 0.8, alkaline phosphatase 49, AST 75, .  The IgA 388, IgG 1574, IgM 114.  The hepatitis B surface antigen is positive.  They did not run a hepatitis B viral DNA.  Previously though back in July the alkaline phosphatase is 49 and the AST 94, .  So the liver enzymes are definitely lower and i suspect the hbv dna is still pending and we will follow up when we have that result. I called and discussed these with you as  was out of the office but sending you this as well for you records.  Donna Moss PA-C Dear Apryl Haley, August 15 labs showed sodium 136 potassium low at 3.3 and you should have like a banana twice a day, or V8 juice twice a day or tomato juice twice a day for 3 days to see if that will correct that. Chloride 105 CO2 of 26 BUN of 7 creatinine 0.8. Sugar was 146 elevated and may be not fasting that day?  Please share with primary provider. Albumin normal 4.1, bilirubin 0.8 alk phos 49 AST 75 and .  Prior July labs had shown an AST of 94 and ALT of 202 so this appears to be coming down.  Prior creatinine was 0.79 so that is the same.  Previous hep B level as we talked about had been higher at 6.8 million so overall labs moving in the right direction there. Your hep B surface antigen was noted to be positive which again not a surprise that you have been chronically positive.  The hep B level as you saw yesterday had come back at 135,000 The immunoglobulins which are looking for other signs of immune issues showed that your IgG is normal at 1574 and IgM normal at 114 IgA normal at 388. WBC was 4.74, hemoglobin 12.9 and platelet count was 262.  Your MCV was a little bit up at 96.  Your MCH was a little low at 35. Neutrophils were 2.11 and lymphocytes were 2.21. I would redo the labs 2 weeks after these and then again in 4 weeks and then at that point if they remain stable we can slow down the labs after that.  Usually the first month or two is  that timeframe during which we need to carefully watch and look for any flares or changes on the labs. Dr Faisal Haley, Curiously they did send this to the [facility] lab that you did previously on August 15.  It was as you stated to 135,000 for the hepatitis B virus level. The July virus level was 6.8 million so clearly lower now. Need to follow this over time. Dr. Faisal Haley, More labs sent in from August 15, 2023 showing that the hep B surface antigen was positive which is not surprising.  We will see what the DNA is doing with the treatment. Dr Faisal Haley, August 15 labs were sent in showing that your IgA was normal at 388, IgG normal at 1574, and IgM normal at 114. Other labs showed sodium 136 potassium 3.3 chloride 105 CO2 26 BUN of 7 creatinine 0.8 and glucose was elevated at 146 and possibly you were not fasting that day?  Albumin was normal at 4.1, bilirubin 0.8, alk phos 49 and AST 75 and  with calcium 9.5. WBC 4.74, hemoglobin 12.9, platelet count 262 with an MCV of 96 which is elevated.  Neutrophils 2.11 and lymphocytes 2.21 normal. As you recall, you had recently started the tenofovir DF and your July 14 at labs had shown an AST that was 94 and an ALT of 202 which were higher so these are coming down. We really need to see what the hepatitis B level comes back. Your previous creatinine was 0.79 and this is approximate the same at 0.8. Please redo labs again for us again in another 2 weeks and 4 weeks. We hope to see these labs continue to drop more and they should on this medicine. Dr. Malone  She did get the TDF and delivered to her and she has been on it for 4 days. Not able to get samples pre the treatment.  She may want to take it with food. She also comments size if larger and with her sjogrens some difficulty swallowing it and if persists may need to appeal for the much smaller vemlidy.  She says she was previously diagnosed AIH and was diagnosed not by bx but by labs and hbv is what we saw.  AIH has markers that suggest it but many diseases can generate the markers and even medicines as well. Many times a bx is needed to confirm.  July 24th ultrasound unfortunately came back after you and I spoke. Pancreas was unremarkable. Liver was homogeneous/even in echotexture with no discrete liver lesions. Liver vessels patent which was good to see. Common bile duct was normal at 3 mm. Right kidney 10.5 cm no hydronephrosis. Spleen normal at 9.8 cm. Splenic vessels were patent as well. Overall good to see that everything remains stable and lets get you started on the hepatitis B medicine and reassess issues.   July 18th: finally hbv labs showing hep B level at 6.8 million. July 14 labs showed sodium 138 potassium 3.6 chloride 106 CO2 26 BUN of 11 creatinine 0.79 total bilirubin 0.7 alk phos 49 AST of 94 .  Calcium 8.8.  Hep B surface antigen was confirmed positive.   Quantitative immunoglobulins showed IgG normal at 1417, IgM normal 119 and IgA normal at 343.  This suggests less likely for an immune driven event. WBC 4.71 hemoglobin 11.5 platelet count 245 with an MCV elevated slightly at 99. The treatment criteria for hepatitis B is to have a DNA level over 2000, and an abnormal ALT over 2 review had both.  She needs to remain on TDF to control the hep b and can then avoid worsening other isp.  We do need to follow labs in 2 and 4 and 6 weeks and look for an immune flare as drop the viral load can see.  Per the notes: Patient apparently has a history of Sjogren's syndrome with inflammatory arthritis and also reported history of autoimmune hepatitis.  I did not see hep b mentioned in the info that we saw.  Patient listed as being on a variety of medicines including artificial tears as needed, magnesium 400 mg twice daily, fish oil 1000 mg a day, vitamin D 10,000 units a day for 1 month and then 2000 units a day, she has a copper IUD, Prozac 20 mg a day, Synthroid 125 mcg a day, hydrochlorothiazide telmisartan 12.5/40 mg a day, and Plaquenil 200 mg a day.  She is on plaquenil for her rheum issues and also dxd no RA and re that she has not reseen rheum for that and seen by whitney monzon.  Rheumatoid factor can be false positive.  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  Surgical history includes thyroid lobectomy in the past, cholecystectomy in the past, left fifth toe x2 hammertoe surgery, right knee meniscal repair, breast augmentation in 2002.  Family history is that she is adopted but biologic mother had a history of hep b and more recently learned of RA/ autoimmune disorders.  Social history lives with daughter/niece 16/13 with autism and has son who is 6-year-old child with mild cerebral palsy. Not a smoker.  Infrequent alcohol.  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  Allergies none.  Last major diagnostic procedures/hospitalization was in arthritis third 2015.  Note mentions that the patient's Sjogren's arthritis is also accompanied by possible autoimmune hepatitis and has a history of hep B felt to be related from vertical transmission from both mother.    They mentioned she has not been on CellCept.  In the notes she knew that she saw us many years ago.  Recent labs showed sed rate 19, aldolase 6.2, CPK 68, gamma GTP 40, WBC 6.4 hemoglobin 12.6 platelet count 272 MCV 94.9 neutrophils 3392 and lymphocytes 35.9.  Sodium 139 potassium 4.0 glucose 88 BUN 13 creatinine 0.8 calcium 9.5 bilirubin 0.6 alk phos 61 AST 56  albumin 4.5  She says for 3m just seeing Dr Alicia and so is establishing with her and says maybe for the last 3-4 m labs have been up and so that is being.  She denied turmeric or green tea or gsm.  She says 8 yrs ago she had a flare and that led to referral then and when seen not felt due to aih and maybe due to hep and labs settled down.   Weight listed as being had a BMI of 24.54.   September 2017 labs showed glucose 88 BUN of 7 creatinine 0.73 sodium 141 potassium 3.8 calcium 9.5 albumin 4.2 bilirubin 0.6 alk phos 59 AST 19 ALT 21 WBC 5.7 hemoglobin 13.1 plate count 233 MCV 95.5. Last visit note that I could find was from September 27, 2017 Harris states that she had the history of the hepatitis B.  Was on.  At that time.  Hep B level was 550 then AST 19 ALT 21 bilirubin 0.6 alk phos 59  Notes mention that she had asthma and was using however, and that her blood pressure has been variable at that time.    Had previously seen Dr. CHEN for irritable bowel.  Hep A immunity had been seen.  Reported initial biopsy stage III in the past.   Colonoscopy with polyp in 2015.

## 2024-08-07 ENCOUNTER — TELEPHONE ENCOUNTER (OUTPATIENT)
Dept: URBAN - METROPOLITAN AREA CLINIC 92 | Facility: CLINIC | Age: 44
End: 2024-08-07

## 2024-09-02 ENCOUNTER — LAB OUTSIDE AN ENCOUNTER (OUTPATIENT)
Dept: URBAN - METROPOLITAN AREA TELEHEALTH 2 | Facility: TELEHEALTH | Age: 44
End: 2024-09-02

## 2024-11-06 ENCOUNTER — LAB OUTSIDE AN ENCOUNTER (OUTPATIENT)
Dept: URBAN - METROPOLITAN AREA TELEHEALTH 2 | Facility: TELEHEALTH | Age: 44
End: 2024-11-06

## 2024-11-06 ENCOUNTER — LAB OUTSIDE AN ENCOUNTER (OUTPATIENT)
Dept: URBAN - METROPOLITAN AREA CLINIC 86 | Facility: CLINIC | Age: 44
End: 2024-11-06

## 2024-11-06 ENCOUNTER — OFFICE VISIT (OUTPATIENT)
Dept: URBAN - METROPOLITAN AREA TELEHEALTH 2 | Facility: TELEHEALTH | Age: 44
End: 2024-11-06

## 2024-11-06 ENCOUNTER — TELEPHONE ENCOUNTER (OUTPATIENT)
Dept: URBAN - METROPOLITAN AREA CLINIC 86 | Facility: CLINIC | Age: 44
End: 2024-11-06

## 2024-11-06 RX ORDER — FLUOXETINE HYDROCHLORIDE 20 MG/1
1 CAPSULE CAPSULE ORAL ONCE A DAY
Status: ACTIVE | COMMUNITY

## 2024-11-06 RX ORDER — OMEPRAZOLE 40 MG/1
1 CAPSULE 30 MINUTES BEFORE MEAL CAPSULE, DELAYED RELEASE ORAL TWICE DAILY
Status: ACTIVE | COMMUNITY

## 2024-11-06 RX ORDER — ONDANSETRON HYDROCHLORIDE 4 MG/1
2 TABLETS TABLET, FILM COATED ORAL
Qty: 2 TABLETS | Refills: 0 | Status: ACTIVE | COMMUNITY
Start: 2023-12-29

## 2024-11-06 RX ORDER — CALCIUM CARBONATE 300MG(750)
AS DIRECTED TABLET,CHEWABLE ORAL
Status: ACTIVE | COMMUNITY

## 2024-11-06 RX ORDER — LEVOTHYROXINE SODIUM 125 UG/1
1 TABLET IN THE MORNING ON AN EMPTY STOMACH TABLET ORAL ONCE A DAY
Status: ACTIVE | COMMUNITY

## 2024-11-06 RX ORDER — COLESEVELAM HYDROCHLORIDE 625 MG/1
ONE TABLET TABLET, COATED ORAL
Qty: 60 TABLETS | Refills: 1 | Status: ACTIVE | COMMUNITY
Start: 2023-12-29

## 2024-11-06 RX ORDER — CYCLOSPORINE 0.5 MG/ML
1 DROP INTO AFFECTED EYE EMULSION OPHTHALMIC TWICE A DAY
Status: ACTIVE | COMMUNITY

## 2024-11-06 RX ORDER — TELMISARTAN AND HYDROCHLOROTHIAZIDE 40; 12.5 MG/1; MG/1
1 TABLET TABLET ORAL ONCE A DAY
Status: ACTIVE | COMMUNITY

## 2024-11-06 RX ORDER — TENOFOVIR DISPROXIL FUMARATE 300 MG/1
1 TABLET TABLET ORAL ONCE A DAY
Qty: 30 | Refills: 2 | Status: ACTIVE | COMMUNITY
Start: 2023-07-24

## 2024-11-06 RX ORDER — HYDROXYCHLOROQUINE SULFATE 200 MG/1
AS DIRECTED TABLET ORAL
Status: ACTIVE | COMMUNITY

## 2024-11-06 RX ORDER — CHOLECALCIFEROL (VITAMIN D3) 50 MCG
1 TABLET TABLET ORAL ONCE A DAY
Status: ACTIVE | COMMUNITY

## 2024-12-13 ENCOUNTER — OFFICE VISIT (OUTPATIENT)
Dept: URBAN - METROPOLITAN AREA CLINIC 91 | Facility: CLINIC | Age: 44
End: 2024-12-13
Payer: OTHER GOVERNMENT

## 2024-12-13 ENCOUNTER — OFFICE VISIT (OUTPATIENT)
Dept: URBAN - METROPOLITAN AREA CLINIC 86 | Facility: CLINIC | Age: 44
End: 2024-12-13
Payer: OTHER GOVERNMENT

## 2024-12-13 VITALS
TEMPERATURE: 98.3 F | BODY MASS INDEX: 22.82 KG/M2 | HEIGHT: 66 IN | SYSTOLIC BLOOD PRESSURE: 150 MMHG | RESPIRATION RATE: 98 BRPM | WEIGHT: 142 LBS | HEART RATE: 92 BPM | DIASTOLIC BLOOD PRESSURE: 87 MMHG

## 2024-12-13 DIAGNOSIS — M19.90 ARTHRITIS: ICD-10-CM

## 2024-12-13 DIAGNOSIS — Z79.899 HIGH RISK MEDICATION USE: ICD-10-CM

## 2024-12-13 DIAGNOSIS — E55.9 VITAMIN D DEFICIENCY: ICD-10-CM

## 2024-12-13 DIAGNOSIS — E03.9 HYPOTHYROIDISM, UNSPECIFIED TYPE: ICD-10-CM

## 2024-12-13 DIAGNOSIS — K58.0 IRRITABLE BOWEL SYNDROME WITH DIARRHEA: ICD-10-CM

## 2024-12-13 DIAGNOSIS — Z90.49 HISTORY OF CHOLECYSTECTOMY: ICD-10-CM

## 2024-12-13 DIAGNOSIS — R76.9 ABNORMAL IMMUNOLOGICAL FINDING IN SERUM: ICD-10-CM

## 2024-12-13 DIAGNOSIS — Z71.89 VACCINE COUNSELING: ICD-10-CM

## 2024-12-13 DIAGNOSIS — B18.1 CHRONIC ACTIVE TYPE B VIRAL HEPATITIS: ICD-10-CM

## 2024-12-13 DIAGNOSIS — U07.1 COVID: ICD-10-CM

## 2024-12-13 DIAGNOSIS — G51.0 BELL'S PALSY: ICD-10-CM

## 2024-12-13 DIAGNOSIS — J45.909 ASTHMA: ICD-10-CM

## 2024-12-13 DIAGNOSIS — M35.00 SECONDARY SJOGREN'S SYNDROME: ICD-10-CM

## 2024-12-13 PROCEDURE — 93975 VASCULAR STUDY: CPT

## 2024-12-13 PROCEDURE — 99214 OFFICE O/P EST MOD 30 MIN: CPT | Performed by: PHYSICIAN ASSISTANT

## 2024-12-13 PROCEDURE — 76705 ECHO EXAM OF ABDOMEN: CPT

## 2024-12-13 RX ORDER — FLUOXETINE HYDROCHLORIDE 20 MG/1
1 CAPSULE CAPSULE ORAL ONCE A DAY
Status: ON HOLD | COMMUNITY

## 2024-12-13 RX ORDER — COLESEVELAM HYDROCHLORIDE 625 MG/1
ONE TABLET TABLET, COATED ORAL
Qty: 60 TABLETS | Refills: 1 | Status: ON HOLD | COMMUNITY
Start: 2023-12-29

## 2024-12-13 RX ORDER — ONDANSETRON HYDROCHLORIDE 4 MG/1
2 TABLETS TABLET, FILM COATED ORAL
Qty: 2 TABLETS | Refills: 0 | Status: ACTIVE | COMMUNITY
Start: 2023-12-29

## 2024-12-13 RX ORDER — TENOFOVIR DISPROXIL FUMARATE 300 MG/1
1 TABLET TABLET ORAL ONCE A DAY
Qty: 30 | Refills: 2 | Status: ACTIVE | COMMUNITY
Start: 2023-07-24

## 2024-12-13 RX ORDER — FLUOXETINE HYDROCHLORIDE 20 MG/1
1 CAPSULE CAPSULE ORAL ONCE A DAY
Status: ACTIVE | COMMUNITY

## 2024-12-13 RX ORDER — CHOLECALCIFEROL (VITAMIN D3) 50 MCG
1 TABLET TABLET ORAL ONCE A DAY
Status: ACTIVE | COMMUNITY

## 2024-12-13 RX ORDER — HYDROXYCHLOROQUINE SULFATE 200 MG/1
AS DIRECTED TABLET ORAL
Status: ACTIVE | COMMUNITY

## 2024-12-13 RX ORDER — CALCIUM CARBONATE 300MG(750)
AS DIRECTED TABLET,CHEWABLE ORAL
Status: ACTIVE | COMMUNITY

## 2024-12-13 RX ORDER — LEVOTHYROXINE SODIUM 125 UG/1
1 TABLET IN THE MORNING ON AN EMPTY STOMACH TABLET ORAL ONCE A DAY
Status: ACTIVE | COMMUNITY

## 2024-12-13 RX ORDER — OMEPRAZOLE 40 MG/1
1 CAPSULE 30 MINUTES BEFORE MEAL CAPSULE, DELAYED RELEASE ORAL TWICE DAILY
Status: ACTIVE | COMMUNITY

## 2024-12-13 RX ORDER — CYCLOSPORINE 0.5 MG/ML
1 DROP INTO AFFECTED EYE EMULSION OPHTHALMIC TWICE A DAY
Status: ACTIVE | COMMUNITY

## 2024-12-13 RX ORDER — LEVOTHYROXINE SODIUM 125 UG/1
1 TABLET IN THE MORNING ON AN EMPTY STOMACH TABLET ORAL ONCE A DAY
Status: ON HOLD | COMMUNITY

## 2024-12-13 RX ORDER — TELMISARTAN AND HYDROCHLOROTHIAZIDE 40; 12.5 MG/1; MG/1
1 TABLET TABLET ORAL ONCE A DAY
Status: ACTIVE | COMMUNITY

## 2024-12-13 RX ORDER — COLESEVELAM HYDROCHLORIDE 625 MG/1
ONE TABLET TABLET, COATED ORAL
Qty: 60 TABLETS | Refills: 1 | Status: ACTIVE | COMMUNITY
Start: 2023-12-29

## 2024-12-13 NOTE — HPI-TODAY'S VISIT:
his is a 43-year-old female last seen  Jan 2024 and prior back in 2017 and being referred from the [facility] rheumatology Rossy Alicia to see us for hep b liver issues and on tdf now.  Copy of the note will be sent to referring providers.   12/13/24 5.4,Blood cells 3.83, hemoglobin 12.5, MCV 91 and platelets 264.  Glucose 83, creatinine 0.85, sodium 143, calcium 3.7, bilirubin 0.4, alkaline phosphatase 71, AST 14 and ALT 22.  Previously in June the AST was 36 and 68 based on this labs she is here and the hepatitis B viral load was listed as 20 and  she was not as consistent w/ the viread but consistent w/ vemlidy. started this in august US today w/ our office and will follow up on this.    8/6/24 She is doing well.  The 2/9/24 ultrasound was sent to me.  The liver appeared homogeneous in echotexture that see lesions.  The hepatic vascular is patent.  Common bile duct is 5 mm, upper limits of normal in size.  Spleen 9.9 cm and this is normal.  Overall they noted that the hepatic vascular is patent and the liver appeared normal.  Overall this seems stable compared to the prior.  Will review at the follow-up. CARMINE Romero  [facility] last done in may she is loosing weight and had colon and egd and had polyps and gastritis and seeing them she notes the viread causes a lot of nausea and loosing weight can try vemlidy she needs to send us labs  she has spondylitis now and dx with ra. seeing rheumatologist friday. on plaquenil bid(this is for her sjrogen and raynauds)  she will be seein [place] [facility]ology she will be due in sept   recap 1/5/24 telemed Dear Apryl Haley,   The 12/27/2023 were found in epic. The sodium 138, potassium 3.4, bilirubin 0.8, alkaline phosphatase 46, AST 23, ALT 29.Hepatitis B viral .Complete blood count showing white blood cells 4.86, hemoglobin 13, MCV 94, platelets 278. Prior 10/9/23 labs with Sodium was 140 potassium 3.6 chloride 107 CO2 of 26 BUN of 10 creatinine 0.8 total protein 7.4 bilirubin 0.9 alk phos 50 AST 55 and ALT 97 with a calcium 9.5. Prior labs to be saw in August 15 had shown then an alk phos of 49, AST of 75,  so these are lower. HBV prior was [id number] so lower now. Happy to see this.   still taking it at night--it causes nausea so this is better for her.  she is feeling well  no missed doses  she needs us due in jan 2024.   recap 10/17/23 labs Dear Apryl Haley, Local labs dated October 9 sent into us today. Sodium was 140 potassium 3.6 chloride 107 CO2 of 26 BUN of 10 creatinine 0.8 total protein 7.4 bilirubin 0.9 alk phos 50 AST 55 and ALT 97 with a calcium 9.5. Prior labs to be saw in August 15 had shown then an alk phos of 49, AST of 75,  so these are lower.  Your creatinine had been 0.8 so that appears to be stable.  hbv [id number], previously in august level was [id number]  asked about if missed doses and maybe 2-3  she did have a flare last month as well so could explain the labs     recap The 8/15/2023 labs were sent to me. The sodium 136 potassium 3.3, creatinine 0.8, bilirubin 0.8, alkaline phosphatase 49, AST 75, .  The IgA 388, IgG 1574, IgM 114.  The hepatitis B surface antigen is positive.  They did not run a hepatitis B viral DNA.  Previously though back in July the alkaline phosphatase is 49 and the AST 94, .  So the liver enzymes are definitely lower and i suspect the hbv dna is still pending and we will follow up when we have that result. I called and discussed these with you as  was out of the office but sending you this as well for you records.  Donna Moss PA-C Dear Apryl Haley, August 15 labs showed sodium 136 potassium low at 3.3 and you should have like a banana twice a day, or V8 juice twice a day or tomato juice twice a day for 3 days to see if that will correct that. Chloride 105 CO2 of 26 BUN of 7 creatinine 0.8. Sugar was 146 elevated and may be not fasting that day?  Please share with primary provider. Albumin normal 4.1, bilirubin 0.8 alk phos 49 AST 75 and .  Prior July labs had shown an AST of 94 and ALT of 202 so this appears to be coming down.  Prior creatinine was 0.79 so that is the same.  Previous hep B level as we talked about had been higher at 6.8 million so overall labs moving in the right direction there. Your hep B surface antigen was noted to be positive which again not a surprise that you have been chronically positive.  The hep B level as you saw yesterday had come back at 135,000 The immunoglobulins which are looking for other signs of immune issues showed that your IgG is normal at 1574 and IgM normal at 114 IgA normal at 388. WBC was 4.74, hemoglobin 12.9 and platelet count was 262.  Your MCV was a little bit up at 96.  Your MCH was a little low at 35. Neutrophils were 2.11 and lymphocytes were 2.21. I would redo the labs 2 weeks after these and then again in 4 weeks and then at that point if they remain stable we can slow down the labs after that.  Usually the first month or two is  that timeframe during which we need to carefully watch and look for any flares or changes on the labs. Dr Faisal Haley, Curiously they did send this to the [facility] lab that you did previously on August 15.  It was as you stated to 135,000 for the hepatitis B virus level. The July virus level was 6.8 million so clearly lower now. Need to follow this over time. Dr. Faisal Haley, More labs sent in from August 15, 2023 showing that the hep B surface antigen was positive which is not surprising.  We will see what the DNA is doing with the treatment. Dr Faisal Haley, August 15 labs were sent in showing that your IgA was normal at 388, IgG normal at 1574, and IgM normal at 114. Other labs showed sodium 136 potassium 3.3 chloride 105 CO2 26 BUN of 7 creatinine 0.8 and glucose was elevated at 146 and possibly you were not fasting that day?  Albumin was normal at 4.1, bilirubin 0.8, alk phos 49 and AST 75 and  with calcium 9.5. WBC 4.74, hemoglobin 12.9, platelet count 262 with an MCV of 96 which is elevated.  Neutrophils 2.11 and lymphocytes 2.21 normal. As you recall, you had recently started the tenofovir DF and your July 14 at labs had shown an AST that was 94 and an ALT of 202 which were higher so these are coming down. We really need to see what the hepatitis B level comes back. Your previous creatinine was 0.79 and this is approximate the same at 0.8. Please redo labs again for us again in another 2 weeks and 4 weeks. We hope to see these labs continue to drop more and they should on this medicine. Dr. Malone  She did get the TDF and delivered to her and she has been on it for 4 days. Not able to get samples pre the treatment.  She may want to take it with food. She also comments size if larger and with her sjogrens some difficulty swallowing it and if persists may need to appeal for the much smaller vemlidy.  She says she was previously diagnosed AIH and was diagnosed not by bx but by labs and hbv is what we saw.  AIH has markers that suggest it but many diseases can generate the markers and even medicines as well. Many times a bx is needed to confirm.  July 24th ultrasound unfortunately came back after you and I spoke. Pancreas was unremarkable. Liver was homogeneous/even in echotexture with no discrete liver lesions. Liver vessels patent which was good to see. Common bile duct was normal at 3 mm. Right kidney 10.5 cm no hydronephrosis. Spleen normal at 9.8 cm. Splenic vessels were patent as well. Overall good to see that everything remains stable and lets get you started on the hepatitis B medicine and reassess issues.   July 18th: finally hbv labs showing hep B level at 6.8 million. July 14 labs showed sodium 138 potassium 3.6 chloride 106 CO2 26 BUN of 11 creatinine 0.79 total bilirubin 0.7 alk phos 49 AST of 94 .  Calcium 8.8.  Hep B surface antigen was confirmed positive.   Quantitative immunoglobulins showed IgG normal at 1417, IgM normal 119 and IgA normal at 343.  This suggests less likely for an immune driven event. WBC 4.71 hemoglobin 11.5 platelet count 245 with an MCV elevated slightly at 99. The treatment criteria for hepatitis B is to have a DNA level over 2000, and an abnormal ALT over 2 review had both.  She needs to remain on TDF to control the hep b and can then avoid worsening other isp.  We do need to follow labs in 2 and 4 and 6 weeks and look for an immune flare as drop the viral load can see.  Per the notes: Patient apparently has a history of Sjogren's syndrome with inflammatory arthritis and also reported history of autoimmune hepatitis.  I did not see hep b mentioned in the info that we saw.  Patient listed as being on a variety of medicines including artificial tears as needed, magnesium 400 mg twice daily, fish oil 1000 mg a day, vitamin D 10,000 units a day for 1 month and then 2000 units a day, she has a copper IUD, Prozac 20 mg a day, Synthroid 125 mcg a day, hydrochlorothiazide telmisartan 12.5/40 mg a day, and Plaquenil 200 mg a day.  She is on plaquenil for her rheum issues and also dxd no RA and re that she has not reseen rheum for that and seen by whitney monzon.  Rheumatoid factor can be false positive.  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  Surgical history includes thyroid lobectomy in the past, cholecystectomy in the past, left fifth toe x2 hammertoe surgery, right knee meniscal repair, breast augmentation in 2002.  Family history is that she is adopted but biologic mother had a history of hep b and more recently learned of RA/ autoimmune disorders.  Social history lives with daughter/niece 16/13 with autism and has son who is 6-year-old child with mild cerebral palsy. Not a smoker.  Infrequent alcohol.  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  Allergies none.  Last major diagnostic procedures/hospitalization was in arthritis third 2015.  Note mentions that the patient's Sjogren's arthritis is also accompanied by possible autoimmune hepatitis and has a history of hep B felt to be related from vertical transmission from both mother.    They mentioned she has not been on CellCept.  In the notes she knew that she saw us many years ago.  Recent labs showed sed rate 19, aldolase 6.2, CPK 68, gamma GTP 40, WBC 6.4 hemoglobin 12.6 platelet count 272 MCV 94.9 neutrophils 3392 and lymphocytes 35.9.  Sodium 139 potassium 4.0 glucose 88 BUN 13 creatinine 0.8 calcium 9.5 bilirubin 0.6 alk phos 61 AST 56  albumin 4.5  She says for 3m just seeing Dr Alicia and so is establishing with her and says maybe for the last 3-4 m labs have been up and so that is being.  She denied turmeric or green tea or gsm.  She says 8 yrs ago she had a flare and that led to referral then and when seen not felt due to aih and maybe due to hep and labs settled down.   Weight listed as being had a BMI of 24.54.   September 2017 labs showed glucose 88 BUN of 7 creatinine 0.73 sodium 141 potassium 3.8 calcium 9.5 albumin 4.2 bilirubin 0.6 alk phos 59 AST 19 ALT 21 WBC 5.7 hemoglobin 13.1 plate count 233 MCV 95.5. Last visit note that I could find was from September 27, 2017 Harris states that she had the history of the hepatitis B.  Was on.  At that time.  Hep B level was 550 then AST 19 ALT 21 bilirubin 0.6 alk phos 59  Notes mention that she had asthma and was using however, and that her blood pressure has been variable at that time.    Had previously seen Dr. CHEN for irritable bowel.  Hep A immunity had been seen.  Reported initial biopsy stage III in the past.   Colonoscopy with polyp in 2015.

## 2024-12-16 ENCOUNTER — TELEPHONE ENCOUNTER (OUTPATIENT)
Dept: URBAN - METROPOLITAN AREA CLINIC 86 | Facility: CLINIC | Age: 44
End: 2024-12-16

## 2024-12-16 ENCOUNTER — WEB ENCOUNTER (OUTPATIENT)
Dept: URBAN - METROPOLITAN AREA CLINIC 86 | Facility: CLINIC | Age: 44
End: 2024-12-16

## 2024-12-16 NOTE — HPI-TODAY'S VISIT:
Dear Apryl Haley,  The recent labs and imaging were sent to us.  The creatinine 0.85, sodium 143, potassium 3.7, bilirubin 0.4, alkaline phosphatase 71, AST 14 ALT 22.  Hepatitis B viral load is 20 and this is lower than before.  White blood cells 5.4, hemoglobin 12.5, MCV 97 and platelets normal at 264.  Hopefully the next time we checked the labs the hepatitis B will be undetected.  The liver appeared normal on the ultrasound and they did not see lesions.  Everything was getting good blood flow.  The common bile duct was 5 mm.  The right kidney appeared normal.  Spleen 10 cm.  Overall the liver appeared normal. Donna Moss PA-C

## 2025-03-13 ENCOUNTER — LAB OUTSIDE AN ENCOUNTER (OUTPATIENT)
Dept: URBAN - METROPOLITAN AREA CLINIC 86 | Facility: CLINIC | Age: 45
End: 2025-03-13

## 2025-06-02 ENCOUNTER — LAB OUTSIDE AN ENCOUNTER (OUTPATIENT)
Dept: URBAN - METROPOLITAN AREA CLINIC 86 | Facility: CLINIC | Age: 45
End: 2025-06-02

## 2025-06-15 ENCOUNTER — TELEPHONE ENCOUNTER (OUTPATIENT)
Dept: URBAN - METROPOLITAN AREA CLINIC 86 | Facility: CLINIC | Age: 45
End: 2025-06-15

## 2025-06-16 ENCOUNTER — OFFICE VISIT (OUTPATIENT)
Dept: URBAN - METROPOLITAN AREA CLINIC 86 | Facility: CLINIC | Age: 45
End: 2025-06-16

## 2025-06-19 ENCOUNTER — OFFICE VISIT (OUTPATIENT)
Dept: URBAN - METROPOLITAN AREA CLINIC 86 | Facility: CLINIC | Age: 45
End: 2025-06-19

## 2025-06-26 ENCOUNTER — OFFICE VISIT (OUTPATIENT)
Dept: URBAN - METROPOLITAN AREA CLINIC 91 | Facility: CLINIC | Age: 45
End: 2025-06-26